# Patient Record
Sex: FEMALE | Race: BLACK OR AFRICAN AMERICAN | NOT HISPANIC OR LATINO | URBAN - METROPOLITAN AREA
[De-identification: names, ages, dates, MRNs, and addresses within clinical notes are randomized per-mention and may not be internally consistent; named-entity substitution may affect disease eponyms.]

---

## 2017-09-22 ENCOUNTER — EMERGENCY (EMERGENCY)
Facility: HOSPITAL | Age: 27
LOS: 1 days | Discharge: HOME | End: 2017-09-22
Attending: EMERGENCY MEDICINE | Admitting: EMERGENCY MEDICINE
Payer: COMMERCIAL

## 2017-09-22 VITALS
HEART RATE: 96 BPM | OXYGEN SATURATION: 100 % | RESPIRATION RATE: 16 BRPM | SYSTOLIC BLOOD PRESSURE: 156 MMHG | DIASTOLIC BLOOD PRESSURE: 82 MMHG

## 2017-09-22 VITALS
DIASTOLIC BLOOD PRESSURE: 76 MMHG | RESPIRATION RATE: 17 BRPM | TEMPERATURE: 99 F | HEART RATE: 92 BPM | SYSTOLIC BLOOD PRESSURE: 128 MMHG | OXYGEN SATURATION: 99 %

## 2017-09-22 LAB
ALBUMIN SERPL ELPH-MCNC: 4.2 G/DL — SIGNIFICANT CHANGE UP (ref 3.3–5)
ALP SERPL-CCNC: 77 U/L — SIGNIFICANT CHANGE UP (ref 40–120)
ALT FLD-CCNC: 15 U/L RC — SIGNIFICANT CHANGE UP (ref 10–45)
ANION GAP SERPL CALC-SCNC: 13 MMOL/L — SIGNIFICANT CHANGE UP (ref 5–17)
APTT BLD: 26.6 SEC — LOW (ref 27.5–37.4)
AST SERPL-CCNC: 20 U/L — SIGNIFICANT CHANGE UP (ref 10–40)
BASE EXCESS BLDV CALC-SCNC: 0.7 MMOL/L — SIGNIFICANT CHANGE UP (ref -2–2)
BASOPHILS # BLD AUTO: 0 K/UL — SIGNIFICANT CHANGE UP (ref 0–0.2)
BILIRUB SERPL-MCNC: 0.5 MG/DL — SIGNIFICANT CHANGE UP (ref 0.2–1.2)
BUN SERPL-MCNC: 9 MG/DL — SIGNIFICANT CHANGE UP (ref 7–23)
CA-I SERPL-SCNC: 1.2 MMOL/L — SIGNIFICANT CHANGE UP (ref 1.12–1.3)
CALCIUM SERPL-MCNC: 8.9 MG/DL — SIGNIFICANT CHANGE UP (ref 8.4–10.5)
CHLORIDE BLDV-SCNC: 106 MMOL/L — SIGNIFICANT CHANGE UP (ref 96–108)
CHLORIDE SERPL-SCNC: 103 MMOL/L — SIGNIFICANT CHANGE UP (ref 96–108)
CO2 BLDV-SCNC: 28 MMOL/L — SIGNIFICANT CHANGE UP (ref 22–30)
CO2 SERPL-SCNC: 25 MMOL/L — SIGNIFICANT CHANGE UP (ref 22–31)
CREAT SERPL-MCNC: 0.63 MG/DL — SIGNIFICANT CHANGE UP (ref 0.5–1.3)
EOSINOPHIL # BLD AUTO: 0.8 K/UL — HIGH (ref 0–0.5)
EOSINOPHIL NFR BLD AUTO: 3 % — SIGNIFICANT CHANGE UP (ref 0–6)
GAS PNL BLDV: 139 MMOL/L — SIGNIFICANT CHANGE UP (ref 136–145)
GAS PNL BLDV: SIGNIFICANT CHANGE UP
GAS PNL BLDV: SIGNIFICANT CHANGE UP
GLUCOSE BLDV-MCNC: 86 MG/DL — SIGNIFICANT CHANGE UP (ref 70–99)
GLUCOSE SERPL-MCNC: 87 MG/DL — SIGNIFICANT CHANGE UP (ref 70–99)
HCG SERPL-ACNC: <2 MIU/ML — SIGNIFICANT CHANGE UP
HCO3 BLDV-SCNC: 26 MMOL/L — SIGNIFICANT CHANGE UP (ref 21–29)
HCT VFR BLD CALC: 37.1 % — SIGNIFICANT CHANGE UP (ref 34.5–45)
HCT VFR BLDA CALC: 36 % — LOW (ref 39–50)
HGB BLD CALC-MCNC: 11.8 G/DL — SIGNIFICANT CHANGE UP (ref 11.5–15.5)
HGB BLD-MCNC: 11.9 G/DL — SIGNIFICANT CHANGE UP (ref 11.5–15.5)
HYPOCHROMIA BLD QL: SLIGHT — SIGNIFICANT CHANGE UP
INR BLD: 1.13 RATIO — SIGNIFICANT CHANGE UP (ref 0.88–1.16)
LACTATE BLDV-MCNC: 1.6 MMOL/L — SIGNIFICANT CHANGE UP (ref 0.7–2)
LYMPHOCYTES # BLD AUTO: 10 % — LOW (ref 13–44)
LYMPHOCYTES # BLD AUTO: 2.1 K/UL — SIGNIFICANT CHANGE UP (ref 1–3.3)
MACROCYTES BLD QL: SLIGHT — SIGNIFICANT CHANGE UP
MCHC RBC-ENTMCNC: 23.2 PG — LOW (ref 27–34)
MCHC RBC-ENTMCNC: 32 GM/DL — SIGNIFICANT CHANGE UP (ref 32–36)
MCV RBC AUTO: 72.6 FL — LOW (ref 80–100)
MONOCYTES # BLD AUTO: 0.6 K/UL — SIGNIFICANT CHANGE UP (ref 0–0.9)
MONOCYTES NFR BLD AUTO: 3 % — SIGNIFICANT CHANGE UP (ref 2–14)
NEUTROPHILS # BLD AUTO: 7.6 K/UL — HIGH (ref 1.8–7.4)
NEUTROPHILS NFR BLD AUTO: 84 % — HIGH (ref 43–77)
PCO2 BLDV: 49 MMHG — SIGNIFICANT CHANGE UP (ref 35–50)
PH BLDV: 7.35 — SIGNIFICANT CHANGE UP (ref 7.35–7.45)
PLAT MORPH BLD: NORMAL — SIGNIFICANT CHANGE UP
PLATELET # BLD AUTO: 231 K/UL — SIGNIFICANT CHANGE UP (ref 150–400)
PO2 BLDV: 33 MMHG — SIGNIFICANT CHANGE UP (ref 25–45)
POLYCHROMASIA BLD QL SMEAR: SLIGHT — SIGNIFICANT CHANGE UP
POTASSIUM BLDV-SCNC: 3.7 MMOL/L — SIGNIFICANT CHANGE UP (ref 3.5–5)
POTASSIUM SERPL-MCNC: 3.7 MMOL/L — SIGNIFICANT CHANGE UP (ref 3.5–5.3)
POTASSIUM SERPL-SCNC: 3.7 MMOL/L — SIGNIFICANT CHANGE UP (ref 3.5–5.3)
PROT SERPL-MCNC: 7.4 G/DL — SIGNIFICANT CHANGE UP (ref 6–8.3)
PROTHROM AB SERPL-ACNC: 12.4 SEC — SIGNIFICANT CHANGE UP (ref 9.8–12.7)
RBC # BLD: 5.11 M/UL — SIGNIFICANT CHANGE UP (ref 3.8–5.2)
RBC # FLD: 13 % — SIGNIFICANT CHANGE UP (ref 10.3–14.5)
RBC BLD AUTO: ABNORMAL
SAO2 % BLDV: 55 % — LOW (ref 67–88)
SODIUM SERPL-SCNC: 141 MMOL/L — SIGNIFICANT CHANGE UP (ref 135–145)
WBC # BLD: 11.1 K/UL — HIGH (ref 3.8–10.5)
WBC # FLD AUTO: 11.1 K/UL — HIGH (ref 3.8–10.5)

## 2017-09-22 PROCEDURE — 73030 X-RAY EXAM OF SHOULDER: CPT | Mod: 26,LT

## 2017-09-22 PROCEDURE — 82435 ASSAY OF BLOOD CHLORIDE: CPT

## 2017-09-22 PROCEDURE — 72125 CT NECK SPINE W/O DYE: CPT

## 2017-09-22 PROCEDURE — 73030 X-RAY EXAM OF SHOULDER: CPT

## 2017-09-22 PROCEDURE — 90715 TDAP VACCINE 7 YRS/> IM: CPT

## 2017-09-22 PROCEDURE — 73110 X-RAY EXAM OF WRIST: CPT | Mod: 26,LT

## 2017-09-22 PROCEDURE — 72125 CT NECK SPINE W/O DYE: CPT | Mod: 26

## 2017-09-22 PROCEDURE — 99284 EMERGENCY DEPT VISIT MOD MDM: CPT | Mod: 25

## 2017-09-22 PROCEDURE — 99285 EMERGENCY DEPT VISIT HI MDM: CPT

## 2017-09-22 PROCEDURE — 71260 CT THORAX DX C+: CPT

## 2017-09-22 PROCEDURE — 70450 CT HEAD/BRAIN W/O DYE: CPT | Mod: 26

## 2017-09-22 PROCEDURE — 84295 ASSAY OF SERUM SODIUM: CPT

## 2017-09-22 PROCEDURE — 85027 COMPLETE CBC AUTOMATED: CPT

## 2017-09-22 PROCEDURE — 82947 ASSAY GLUCOSE BLOOD QUANT: CPT

## 2017-09-22 PROCEDURE — 85610 PROTHROMBIN TIME: CPT

## 2017-09-22 PROCEDURE — 80053 COMPREHEN METABOLIC PANEL: CPT

## 2017-09-22 PROCEDURE — 73060 X-RAY EXAM OF HUMERUS: CPT | Mod: 26,LT

## 2017-09-22 PROCEDURE — 74177 CT ABD & PELVIS W/CONTRAST: CPT | Mod: 26

## 2017-09-22 PROCEDURE — 85014 HEMATOCRIT: CPT

## 2017-09-22 PROCEDURE — 96374 THER/PROPH/DIAG INJ IV PUSH: CPT | Mod: XU

## 2017-09-22 PROCEDURE — 73110 X-RAY EXAM OF WRIST: CPT

## 2017-09-22 PROCEDURE — 83605 ASSAY OF LACTIC ACID: CPT

## 2017-09-22 PROCEDURE — 70450 CT HEAD/BRAIN W/O DYE: CPT

## 2017-09-22 PROCEDURE — 82803 BLOOD GASES ANY COMBINATION: CPT

## 2017-09-22 PROCEDURE — 84702 CHORIONIC GONADOTROPIN TEST: CPT

## 2017-09-22 PROCEDURE — 90471 IMMUNIZATION ADMIN: CPT

## 2017-09-22 PROCEDURE — 82330 ASSAY OF CALCIUM: CPT

## 2017-09-22 PROCEDURE — 85730 THROMBOPLASTIN TIME PARTIAL: CPT

## 2017-09-22 PROCEDURE — 84132 ASSAY OF SERUM POTASSIUM: CPT

## 2017-09-22 PROCEDURE — 73060 X-RAY EXAM OF HUMERUS: CPT

## 2017-09-22 PROCEDURE — 71260 CT THORAX DX C+: CPT | Mod: 26

## 2017-09-22 PROCEDURE — 74177 CT ABD & PELVIS W/CONTRAST: CPT

## 2017-09-22 RX ORDER — ACETAMINOPHEN 500 MG
1000 TABLET ORAL ONCE
Qty: 0 | Refills: 0 | Status: COMPLETED | OUTPATIENT
Start: 2017-09-22 | End: 2017-09-22

## 2017-09-22 RX ORDER — TETANUS TOXOID, REDUCED DIPHTHERIA TOXOID AND ACELLULAR PERTUSSIS VACCINE, ADSORBED 5; 2.5; 8; 8; 2.5 [IU]/.5ML; [IU]/.5ML; UG/.5ML; UG/.5ML; UG/.5ML
0.5 SUSPENSION INTRAMUSCULAR ONCE
Qty: 0 | Refills: 0 | Status: COMPLETED | OUTPATIENT
Start: 2017-09-22 | End: 2017-09-22

## 2017-09-22 RX ADMIN — TETANUS TOXOID, REDUCED DIPHTHERIA TOXOID AND ACELLULAR PERTUSSIS VACCINE, ADSORBED 0.5 MILLILITER(S): 5; 2.5; 8; 8; 2.5 SUSPENSION INTRAMUSCULAR at 15:58

## 2017-09-22 RX ADMIN — Medication 400 MILLIGRAM(S): at 15:58
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
## 2017-09-22 NOTE — ED PROVIDER NOTE - PHYSICAL EXAMINATION
Burnham:  General: No distress.  Mentation at baseline.   HEENT: WNL  Chest/Lungs: CTAB, No wheeze, No retractions, No increased work of breathing, Normal rate  Heart: S1S2 RRR, No M/R/G, Pules equal Bilaterally in upper and lower extremities distally  Abd: soft, NT/ND, No guarding, No rebound.  No hernias, no palpable masses.  Extrem: deformity to left humeral area, no significant edema noted, No ulcers.  Cap refil < 2sec.  Skin: No rash noted, warm dry.  Neuro:  Grossly normal.  No difficulty ambulating. No focal deficits.  Psychiatric: No evidence of delusions. No SI/HI.
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
## 2017-09-22 NOTE — ED PROVIDER NOTE - ATTENDING CONTRIBUTION TO CARE
Chacha:  I have independently evaluated the patient and have documented in the appropriate sections above.  I agree with the exam and plan as noted above.

## 2017-09-22 NOTE — ED PROVIDER NOTE - PROGRESS NOTE DETAILS
Reed: no fracture in arm all radiology studies negative.  Cleaned wounds and bacitracin applied. Patient ok for discharge, follow up discussed.

## 2017-09-22 NOTE — ED ADULT NURSE NOTE - CAS TRG GEN SKIN COLOR
Received a Rejection Message from  [facility] [place]    Re: metFORMIN (GLUCOPHAGE-XR) 500 MG 24 hr tablet, Sig: Take 2 tablets (1,000 mg) by mouth daily (with dinner), disp #60 x 1, Approved 03/08/2017    Message: [facility] must fill 90-Day supply, if not on hold obtain 90 day Rx from MD   Normal for race

## 2018-04-01 ENCOUNTER — EMERGENCY (EMERGENCY)
Facility: HOSPITAL | Age: 28
LOS: 1 days | Discharge: HOME | End: 2018-04-01
Attending: EMERGENCY MEDICINE | Admitting: EMERGENCY MEDICINE
Payer: COMMERCIAL

## 2018-04-01 VITALS
HEART RATE: 106 BPM | SYSTOLIC BLOOD PRESSURE: 134 MMHG | TEMPERATURE: 98 F | DIASTOLIC BLOOD PRESSURE: 74 MMHG | OXYGEN SATURATION: 100 % | RESPIRATION RATE: 18 BRPM

## 2018-04-01 PROCEDURE — 99285 EMERGENCY DEPT VISIT HI MDM: CPT | Mod: 25

## 2018-04-01 NOTE — ED ADULT TRIAGE NOTE - CHIEF COMPLAINT QUOTE
Pt c/o palpitations this afternoon, this evening c/o chest discomfort. Denies SOB. Denies n/v. Denies PMH.

## 2018-04-02 LAB
ALBUMIN SERPL ELPH-MCNC: 4.6 G/DL — SIGNIFICANT CHANGE UP (ref 3.3–5)
ALP SERPL-CCNC: 85 U/L — SIGNIFICANT CHANGE UP (ref 40–120)
ALT FLD-CCNC: 13 U/L — SIGNIFICANT CHANGE UP (ref 4–33)
ANISOCYTOSIS BLD QL: SIGNIFICANT CHANGE UP
AST SERPL-CCNC: 19 U/L — SIGNIFICANT CHANGE UP (ref 4–32)
BASOPHILS # BLD AUTO: 0.05 K/UL — SIGNIFICANT CHANGE UP (ref 0–0.2)
BASOPHILS NFR BLD AUTO: 0.4 % — SIGNIFICANT CHANGE UP (ref 0–2)
BASOPHILS NFR SPEC: 0.9 % — SIGNIFICANT CHANGE UP (ref 0–2)
BILIRUB SERPL-MCNC: 0.5 MG/DL — SIGNIFICANT CHANGE UP (ref 0.2–1.2)
BUN SERPL-MCNC: 10 MG/DL — SIGNIFICANT CHANGE UP (ref 7–23)
CALCIUM SERPL-MCNC: 9.1 MG/DL — SIGNIFICANT CHANGE UP (ref 8.4–10.5)
CHLORIDE SERPL-SCNC: 101 MMOL/L — SIGNIFICANT CHANGE UP (ref 98–107)
CK MB BLD-MCNC: 1.7 NG/ML — SIGNIFICANT CHANGE UP (ref 1–4.7)
CK SERPL-CCNC: 165 U/L — SIGNIFICANT CHANGE UP (ref 25–170)
CO2 SERPL-SCNC: 28 MMOL/L — SIGNIFICANT CHANGE UP (ref 22–31)
CREAT SERPL-MCNC: 0.7 MG/DL — SIGNIFICANT CHANGE UP (ref 0.5–1.3)
D DIMER BLD IA.RAPID-MCNC: 278 NG/ML — SIGNIFICANT CHANGE UP
EOSINOPHIL # BLD AUTO: 1 K/UL — HIGH (ref 0–0.5)
EOSINOPHIL NFR BLD AUTO: 8.7 % — HIGH (ref 0–6)
EOSINOPHIL NFR FLD: 7 % — HIGH (ref 0–6)
GIANT PLATELETS BLD QL SMEAR: PRESENT — SIGNIFICANT CHANGE UP
GLUCOSE SERPL-MCNC: 88 MG/DL — SIGNIFICANT CHANGE UP (ref 70–99)
HCT VFR BLD CALC: 37.7 % — SIGNIFICANT CHANGE UP (ref 34.5–45)
HGB BLD-MCNC: 11.7 G/DL — SIGNIFICANT CHANGE UP (ref 11.5–15.5)
IMM GRANULOCYTES # BLD AUTO: 0.04 # — SIGNIFICANT CHANGE UP
IMM GRANULOCYTES NFR BLD AUTO: 0.3 % — SIGNIFICANT CHANGE UP (ref 0–1.5)
LYMPHOCYTES # BLD AUTO: 18.8 % — SIGNIFICANT CHANGE UP (ref 13–44)
LYMPHOCYTES # BLD AUTO: 2.15 K/UL — SIGNIFICANT CHANGE UP (ref 1–3.3)
LYMPHOCYTES NFR SPEC AUTO: 8.8 % — LOW (ref 13–44)
MCHC RBC-ENTMCNC: 22.4 PG — LOW (ref 27–34)
MCHC RBC-ENTMCNC: 31 % — LOW (ref 32–36)
MCV RBC AUTO: 72.2 FL — LOW (ref 80–100)
MICROCYTES BLD QL: SIGNIFICANT CHANGE UP
MONOCYTES # BLD AUTO: 1.02 K/UL — HIGH (ref 0–0.9)
MONOCYTES NFR BLD AUTO: 8.9 % — SIGNIFICANT CHANGE UP (ref 2–14)
MONOCYTES NFR BLD: 3.5 % — SIGNIFICANT CHANGE UP (ref 2–9)
NEUTROPHIL AB SER-ACNC: 77.2 % — HIGH (ref 43–77)
NEUTROPHILS # BLD AUTO: 7.18 K/UL — SIGNIFICANT CHANGE UP (ref 1.8–7.4)
NEUTROPHILS NFR BLD AUTO: 62.9 % — SIGNIFICANT CHANGE UP (ref 43–77)
NRBC # FLD: 0 — SIGNIFICANT CHANGE UP
PLATELET # BLD AUTO: 243 K/UL — SIGNIFICANT CHANGE UP (ref 150–400)
PLATELET COUNT - ESTIMATE: NORMAL — SIGNIFICANT CHANGE UP
PMV BLD: 11.1 FL — SIGNIFICANT CHANGE UP (ref 7–13)
POIKILOCYTOSIS BLD QL AUTO: SLIGHT — SIGNIFICANT CHANGE UP
POLYCHROMASIA BLD QL SMEAR: SIGNIFICANT CHANGE UP
POTASSIUM SERPL-MCNC: 4 MMOL/L — SIGNIFICANT CHANGE UP (ref 3.5–5.3)
POTASSIUM SERPL-SCNC: 4 MMOL/L — SIGNIFICANT CHANGE UP (ref 3.5–5.3)
PROT SERPL-MCNC: 7.4 G/DL — SIGNIFICANT CHANGE UP (ref 6–8.3)
RBC # BLD: 5.22 M/UL — HIGH (ref 3.8–5.2)
RBC # FLD: 14.1 % — SIGNIFICANT CHANGE UP (ref 10.3–14.5)
SODIUM SERPL-SCNC: 138 MMOL/L — SIGNIFICANT CHANGE UP (ref 135–145)
TROPONIN T SERPL-MCNC: < 0.06 NG/ML — SIGNIFICANT CHANGE UP (ref 0–0.06)
TSH SERPL-MCNC: 2.11 UIU/ML — SIGNIFICANT CHANGE UP (ref 0.27–4.2)
VARIANT LYMPHS # BLD: 2.6 % — SIGNIFICANT CHANGE UP
WBC # BLD: 11.44 K/UL — HIGH (ref 3.8–10.5)
WBC # FLD AUTO: 11.44 K/UL — HIGH (ref 3.8–10.5)

## 2018-04-02 PROCEDURE — 71046 X-RAY EXAM CHEST 2 VIEWS: CPT | Mod: 26

## 2018-04-02 PROCEDURE — 71275 CT ANGIOGRAPHY CHEST: CPT | Mod: 26

## 2018-04-02 RX ORDER — SODIUM CHLORIDE 9 MG/ML
1000 INJECTION INTRAMUSCULAR; INTRAVENOUS; SUBCUTANEOUS ONCE
Qty: 0 | Refills: 0 | Status: COMPLETED | OUTPATIENT
Start: 2018-04-02 | End: 2018-04-02

## 2018-04-02 NOTE — ED PROVIDER NOTE - MEDICAL DECISION MAKING DETAILS
27 y.o female no pmhx here with intermittent palpitations since 1:30 this afternoon with L sided cp that felt like her gas pains, no current. EKG sinus tach. Pt refusing IV. Will draw basic labs, dimer, ce , tsh, xray chest, reassess. 27 y.o female no pmhx here with intermittent palpitations since 1:30 this afternoon with L sided cp that felt like her gas pains, no current symptoms. EKG sinus tachycardia. Pt refusing IV. Will draw basic labs, dimer, ce , tsh, xray chest, reassess. discussed if Ddimer positive will need IV, agrees. 27 y.o female no pmhx here with intermittent palpitations/heart racing since 1:30 this afternoon with L sided cp that felt like her gas pains, no current cp. EKG sinus tachycardia. Pt refusing IV at this time. Will draw basic labs, dimer, ce , tsh, xray chest, reassess. discussed if Ddimer positive will need IV, patient agrees. 27 y.o female no pmhx here with intermittent palpitations/heart racing since 1:30 this afternoon with associated L sided chest discomfort. no current cp. EKG sinus tachycardia. Will draw basic labs, dimer, ce , tsh, xray chest, reassess. refusing iv placement at this time, discussed if Ddimer positive will need IV, patient agrees.

## 2018-04-02 NOTE — ED PROVIDER NOTE - OBJECTIVE STATEMENT
27 y.o female no pmhx here with intermittent palpitations since 1:30 this afternoon after taking Claritin D for her allergies. States she has been feeling like her heart was racing throughout the day. Also states had some chest discomfort over her L breast that feels like gas that she gets. Denies fevers, chills, SOB, cough, n/v/d, abdominal pain, numbness, tingling, weakness, urinary symptoms. 27 y.o female no pmhx here with intermittent palpitations since 1:30 this afternoon after taking Claritin D for her allergies. States she has been feeling like her heart was racing throughout the day.  Was sitting at home watching tv tonight and said her HR was in the 120s and her  told her to come to the ED. Also states had some chest discomfort that started with the palpitations over her L chest intermittently today that feels like gas that she gets. Denies fevers, chills, SOB, cough, n/v/d, abdominal pain, numbness, tingling, weakness, urinary symptoms. No recent travel ,no LE swelling, not on OCPs. 27 y.o female no pmhx here with intermittent palpitations since 1:30 this afternoon after taking Claritin D for her allergies, has taken this medication many times for allergies and never had an issue. States she has been feeling like her heart was racing throughout the day. Was sitting at home watching tv tonight and said her HR was in the 120s and her  told her to come to the ED. Also states had some chest discomfort that started with the palpitations over her L chest intermittently today that feels like maybe gas that she gets. Denies fevers, chills, SOB, cough, n/v/d, abdominal pain, numbness, tingling, weakness, urinary symptoms. No recent travel ,no LE swelling, not on OCPs.

## 2018-04-02 NOTE — ED PROVIDER NOTE - PROGRESS NOTE DETAILS
serg: pt with positive Ddimer- will place line and get CTA, pt agrees with plan. signed out to ANIA Thompson to f/u CTA results and lab results. PA Thompson- CTA neg for PE. Pt stable for dc

## 2018-04-02 NOTE — ED PROVIDER NOTE - ATTENDING CONTRIBUTION TO CARE
MD Vargas:  patient seen and evaluated with the PA.  I was present for key portions of the History and Physical, and I agree with the Impression and Plan.    MD Vargas:  28 yo F, c/o palpitations x 10 hrs with associated CP.  Intensity:  5/10.  Location:  substernal.  Quality:  sharp pain, heart racing sensation.  Better/worse:  none.  VS - tachy, normotensive.  Physical Exam: adult F, NAD, NCAT, PERRL, EOMI, neck supple, CTA B, +tachy, no calf pain, no abd pain.  Impression:  palpitations with abnormal ECG.  Plan:  labs, ddimer, reassess.

## 2019-09-16 NOTE — ED ADULT NURSE NOTE - PT NEEDS ASSIST
Mica Olguin D/C'd.  Discharge instructions including s/s to return to ED, follow up appointments, hydration importance and fracture provided to pt/family.    Parents verbalized understanding with no further questions and concerns.    Copy of discharge provided to pt/family.  Signed copy in chart.    Pt walked out of department with mother; pt in NAD, awake, alert, interactive and age appropriate.          yes

## 2020-05-26 ENCOUNTER — EMERGENCY (EMERGENCY)
Facility: HOSPITAL | Age: 30
LOS: 1 days | Discharge: HOME | End: 2020-05-26
Attending: STUDENT IN AN ORGANIZED HEALTH CARE EDUCATION/TRAINING PROGRAM
Payer: COMMERCIAL

## 2020-05-26 VITALS
SYSTOLIC BLOOD PRESSURE: 134 MMHG | DIASTOLIC BLOOD PRESSURE: 74 MMHG | OXYGEN SATURATION: 97 % | TEMPERATURE: 99 F | RESPIRATION RATE: 16 BRPM | HEART RATE: 103 BPM

## 2020-05-26 PROCEDURE — 99282 EMERGENCY DEPT VISIT SF MDM: CPT

## 2020-05-26 NOTE — ED ADULT TRIAGE NOTE - CHIEF COMPLAINT QUOTE
Pt. c/o sore throat since this AM. States she also had hand swelling since yesterday which has subsided. Went to her PCP today and was given a steroid shot. Denies cough, n/v/d or fevers. No known covid exposure.

## 2020-05-26 NOTE — ED PROVIDER NOTE - NSFOLLOWUPINSTRUCTIONS_ED_ALL_ED_FT
Pharyngitis    Pharyngitis is inflammation of your pharynx, which is typically caused by a viral or bacterial infection. Pharyngitis can be contagious and may spread from person to person through intimate contact, coughing, sneezing, or sharing personal items and utensils. Symptoms of pharyngitis may include sore throat, fever, headache, or swollen lymph nodes. If you are prescribed antibiotics, make sure you finish them even if you start to feel better. Gargle with salt water as often as every 1-2 hours to soothe your throat. Throat lozenges (if you are not at risk for choking) or sprays may be used to soothe your throat.    SEEK IMMEDIATE MEDICAL CARE IF YOU HAVE ANY OF THE FOLLOWING SYMPTOMS: neck stiffness, drooling, hoarseness or change in voice, inability to swallow liquids, vomiting, or trouble breathing.     -Please follow up with your Primary Care Doctor    -Please follow up with your gastroenterologist

## 2020-05-26 NOTE — ED PROVIDER NOTE - ATTENDING CONTRIBUTION TO CARE
Well appearing Young female with one day sore throat hemodynamically stable with midline uvula and no tonsilar swelling or exudates. Sore throat likely 2/2 to patients worsening GERD. Reassurance, diet recs with discharge home and return precautions.    MADELINE Beth: I have personally performed a face to face bedside history and physical examination of this patient. I have discussed the history, examination, review of systems, assessment and plan of management with the resident. I have reviewed the electronic medical record and amended it to reflect my history, review of systems, physical exam, assessment and plan.

## 2020-05-26 NOTE — ED PROVIDER NOTE - PROGRESS NOTE DETAILS
Jeff, PGY1: Explained to patient decadron injection will improve throat pain. VSS. Time was taken to answer all of patients questions and concerns. Return precaution instructions were given and patient understands and feels comfortable with disposition. Pt advised about diet changes. Pt has appointment for endoscopy on Saturday. Was offered COVID testing but patient declined.

## 2020-05-26 NOTE — ED PROVIDER NOTE - PATIENT PORTAL LINK FT
You can access the FollowMyHealth Patient Portal offered by [facility] by registering at the following website: [email or web address]. By joining [employer]’s FollowMyHealth portal, you will also be able to view your health information using other applications (apps) compatible with our system.

## 2020-05-26 NOTE — ED PROVIDER NOTE - CLINICAL SUMMARY MEDICAL DECISION MAKING FREE TEXT BOX
Pt is a young female with one day sore throat s/p glucocorticoid injection. Her physical exam came back normal and patient hemodynamically stable. She is well appearing. Plan for discharge home with reassurance. Well appearing Young female with one day sore throat hemodynamically stable with midline uvula and no tonsilar swelling or exudates. Sore throat likely 2/2 to patients worsening GERD. Reassurance, diet recs with discharge home and return precautions.

## 2020-05-26 NOTE — ED PROVIDER NOTE - OBJECTIVE STATEMENT
Pt is a 34 y/o female with no PMHx presents to the ER today with a sore throat. She adds that she has swelling in her joints which includes her knees and elbows. The patient states that she has had muscle aches for the past 6 weeks. She recently visited her allergist, who gave her steroid injections, this did not help her symptoms. She came to the ER for further evaluation. Patient denies fever, chills, voice changes ,or drooling. Pt is a 31 y/o F with no PMHx presents to the ER today with a sore throat. She adds that she has swelling in her joints which includes her knees and elbows. The patient states that she has had muscle aches for the past 6 weeks. She visited her allergist today, who gave her decadron injection which did not help her symptoms. She came to the ER for further evaluation. Pt also c/o acid reflux symptoms been taking omeprazole with little relief, started taking famotidine today. Patient denies fever, chills, voice changes ,or drooling, nausea vomiting, chest pain, cough, sob, abdominal pain.

## 2020-06-13 ENCOUNTER — EMERGENCY (EMERGENCY)
Facility: HOSPITAL | Age: 30
LOS: 1 days | Discharge: HOME | End: 2020-06-13
Attending: EMERGENCY MEDICINE | Admitting: EMERGENCY MEDICINE
Payer: COMMERCIAL

## 2020-06-13 VITALS
HEART RATE: 94 BPM | RESPIRATION RATE: 17 BRPM | OXYGEN SATURATION: 96 % | TEMPERATURE: 98 F | SYSTOLIC BLOOD PRESSURE: 117 MMHG | DIASTOLIC BLOOD PRESSURE: 62 MMHG

## 2020-06-13 VITALS
RESPIRATION RATE: 20 BRPM | OXYGEN SATURATION: 98 % | TEMPERATURE: 99 F | SYSTOLIC BLOOD PRESSURE: 132 MMHG | DIASTOLIC BLOOD PRESSURE: 78 MMHG | HEART RATE: 117 BPM

## 2020-06-13 PROCEDURE — 70360 X-RAY EXAM OF NECK: CPT | Mod: 26

## 2020-06-13 PROCEDURE — 99284 EMERGENCY DEPT VISIT MOD MDM: CPT

## 2020-06-13 RX ORDER — DEXAMETHASONE 0.5 MG/5ML
10 ELIXIR ORAL ONCE
Refills: 0 | Status: COMPLETED | OUTPATIENT
Start: 2020-06-13 | End: 2020-06-13

## 2020-06-13 RX ADMIN — Medication 102 MILLIGRAM(S): at 02:16

## 2020-06-13 NOTE — ED ADULT NURSE NOTE - CHIEF COMPLAINT QUOTE
"Yesturday I was in [facility] because my throat was swollen, they diagnosed me with an allergic reaction and sent me home with prednisone. after they gave me prednisone yesterday I felt very shaky, I thought I was cold at first but today, I still feeling very shaky only after I take the prednisone which was at 1050PM, my mouth feels very dry, and I still have trouble swallowing." patient speaking in full sentences. no audio stridor noted.

## 2020-06-13 NOTE — ED PROVIDER NOTE - PHYSICAL EXAMINATION
General: well appearing, interactive, well nourished, no apparent distress, ncat  HEENT: EOMI, PERRLA, normal mucosa, normal oropharynx, no lesions on the lips or on oral mucosa, normal external ear  Neck: supple, no lymphadenopathy, full range of motion, no nuchal rigidity  CV: RRR, normal S1 and S2 with no murmur, capillary refill less than two seconds  Resp: lungs CTA b/l, good aeration bilaterally, symmetric chest wall   Abd: non-distended, soft, non-tender  : no CVA tenderness  MSK: full range of motion, no cyanosis, no edema, no clubbing, no immobility  Neuro: CN II-XII grossly intact, muscle strength 5/5 in all extremities, normal gait  Skin: no rashes, skin intact

## 2020-06-13 NOTE — ED PROVIDER NOTE - ATTENDING CONTRIBUTION TO CARE
MD Agee:  I performed a face to face bedside interview with patient regarding history of present illness, review of symptoms and past medical history. I completed an independent physical exam(documented below).  I have discussed patient's plan of care with resident.   I agree with note as stated above, having amended the EMR as needed to reflect my findings. I have discussed the assessment and plan of care.  This includes during the time I functioned as the attending physician for this patient.  PE:  Gen: Alert, NAD  Head: NC, AT,  EOMI, normal lids/conjunctiva  ENT:  normal hearing, patent oropharynx without erythema/exudate  Neck: +supple, no tenderness/meningismus/JVD, +Trachea midline  Chest: no chest wall tenderness, equal chest rise  Pulm: Bilateral BS, normal resp effort, no wheeze/stridor/retractions  CV: RRR, no M/R/G, +dist pulses  Abd: +BS, soft, NT/ND  Rectal: deferred  Mskel: no edema/erythema/cyanosis  Skin: no rash  Neuro: AAOx3  MDM:   31yo F, w/ working diagnosis of angioedema (via outpt w/u with allergist), was seen at [facility] for recurrent episode of this at which time she was prescribed prednisone, presents today c/o jitteriness after taking prednisone for first time ever (2 doses so far and jitteriness after each). Pt likely having adverse effect to prednisone, will give decadron instead as pt has tolerated this medication in the past. Of note, no oropharyngeal edema on exam, and pt is tolerating secretions, po food/drink and having no difficulty breathing at this time but c/o "tightness" in neck, will get xray to evaluate trachea.

## 2020-06-13 NOTE — ED PROVIDER NOTE - NSFOLLOWUPINSTRUCTIONS_ED_ALL_ED_FT
1) Please follow up with your Primary Care Provider in 24-48 hours  2) Seek immediate medical care for any new or returning symptoms including but not limited severe pain, difficulty breathing and/or eating, high fevers  3) Take Tylenol 650 mg every 4-6 hours as needed for pain. Do not take more than 2 grams within a 24 hour period

## 2020-06-13 NOTE — ED ADULT NURSE NOTE - OBJECTIVE STATEMENT
Allergic reaction yesterday, seen in [facility] prescribed prednisone and d/c'd to home, reports took prescribed prednisone w/ acute onset tremors, anxiety and dysphagia, reports throat feeling tight and lump in throat. Patient stable, reports seen in this ED last month for s/a c/o, anti-allergy cocktail administered w/ effect, seen allergist outpatient dx: angioedema, no food allergies, (+)environmental allergies. Denies cutaneous involvement, no hives, pruritus or rash, denies recent infection, no SOB.   Uvula midline, non-edematous, no tonsillar or tongue swelling, airway patent and intact, self-maintained, no thyromegaly. Allergic reaction yesterday, seen in [facility] prescribed prednisone and d/c'd to home, reports took prescribed prednisone w/ acute onset tremors, anxiety and dysphagia, reports throat feeling tight and lump in throat. Patient stable, reports seen in this ED last month for s/a c/o, anti-allergy cocktail administered w/ effect, seen allergist outpatient dx: angioedema, no food allergies, (+)environmental allergies, subsequently prescribed zyall and . Denies cutaneous involvement, no hives, pruritus or rash, denies recent infection, no SOB.   Uvula midline, non-edematous, no tonsillar or tongue swelling, airway patent and intact, self-maintained, no thyromegaly.

## 2020-06-13 NOTE — ED PROVIDER NOTE - PROGRESS NOTE DETAILS
Patient reassessed, NAD, non-toxic appearing. results dw pt, questions answered. reports mild improvement. dc w/ strict return precautions.  - Bam Santana D.O. PGY2

## 2020-06-13 NOTE — ED ADULT TRIAGE NOTE - CHIEF COMPLAINT QUOTE
"Yesturday I was in [facility] because my throat was swollen, they diagnosed me with an allergic reaction and sent me home with prednisone. after they gave me prednisone yesterday I felt very shaky, I thought I was cold at first but today, I still feeling very shaky only after I take the prednisone which was at 1050PM, my mouth feels very dry, and I still have trouble swallowing." patient speaking in full sentences. no audio stridor noted.

## 2020-06-13 NOTE — ED PROVIDER NOTE - OBJECTIVE STATEMENT
31 yo f pmh angioedema, pw throat discomfort. pt reports went to OSH yesterday for throat discomfort (swelling, hoarsesness), was given prednisone and dc. pt reports took prednisone yesterday and today and developed jitteriness each time after episode. pt was concerned w/ sx and desired eval. pt reports throat discomfort less than yesterday however, still present. pt was eval by allergist recently w/ negative patch test for food allegies and told she has enviromental allergies. pt was told to take allegra and xolair. denies f/c, cp, sob, cough, n/v. tolerating po.

## 2020-06-13 NOTE — ED PROVIDER NOTE - NS ED ROS FT
GENERAL: No fever or chills, //             EYES: no change in vision, //             HEENT: throat discomfort //             CARDIAC: no chest pain, //              PULMONARY: no cough or SOB, //             GI: no abdominal pain, no nausea or no vomiting, no diarrhea or constipation, //             : No changes in urination,  //            SKIN: no rashes,  //            NEURO: no headache,  //             MSK: No joint pain otherwise as HPI or negative. ~Bam Santana DO PGY2

## 2020-06-13 NOTE — ED PROVIDER NOTE - PATIENT PORTAL LINK FT
You can access the FollowMyHealth Patient Portal offered by [facility] by registering at the following website: [email or web address]. By joining [employer]’s FollowMyHealth portal, you will also be able to view your health information using other applications (apps) compatible with our system.

## 2020-06-13 NOTE — ED PROVIDER NOTE - CLINICAL SUMMARY MEDICAL DECISION MAKING FREE TEXT BOX
cass pgy2: 31 yo f pw throat discomfort in setting of new dx of angioedema and rxn to prednisone. pt well appearing, speaking full sentences, no s/s of airway compromise. will rec pt to stop prednisone, provide decadron and observe. pt will likely require outpatient to continue rubio. no s/s infectious etiology.

## 2020-07-08 ENCOUNTER — APPOINTMENT (OUTPATIENT)
Dept: NEUROLOGY | Facility: CLINIC | Age: 30
End: 2020-07-08
Payer: COMMERCIAL

## 2020-07-08 VITALS
DIASTOLIC BLOOD PRESSURE: 76 MMHG | HEIGHT: 65 IN | BODY MASS INDEX: 26.66 KG/M2 | HEART RATE: 82 BPM | WEIGHT: 160 LBS | SYSTOLIC BLOOD PRESSURE: 114 MMHG

## 2020-07-08 VITALS — TEMPERATURE: 98 F

## 2020-07-08 DIAGNOSIS — K21.9 GASTRO-ESOPHAGEAL REFLUX DISEASE W/OUT ESOPHAGITIS: ICD-10-CM

## 2020-07-08 DIAGNOSIS — Z78.9 OTHER SPECIFIED HEALTH STATUS: ICD-10-CM

## 2020-07-08 DIAGNOSIS — G56.03 CARPAL TUNNEL SYNDROM,BILATERAL UPPER LIMBS: ICD-10-CM

## 2020-07-08 DIAGNOSIS — J30.2 OTHER SEASONAL ALLERGIC RHINITIS: ICD-10-CM

## 2020-07-08 PROBLEM — Z00.00 ENCOUNTER FOR PREVENTIVE HEALTH EXAMINATION: Status: ACTIVE | Noted: 2020-07-08

## 2020-07-08 PROCEDURE — 99205 OFFICE O/P NEW HI 60 MIN: CPT

## 2020-07-08 RX ORDER — LEVOCETIRIZINE DIHYDROCHLORIDE 0.5 MG/ML
SOLUTION ORAL
Refills: 0 | Status: ACTIVE | COMMUNITY
Start: 2020-07-08

## 2020-07-08 RX ORDER — OMALIZUMAB 300 MG/2ML
INJECTION, SOLUTION SUBCUTANEOUS
Refills: 0 | Status: ACTIVE | COMMUNITY
Start: 2020-07-08
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
## 2020-07-08 NOTE — DISCUSSION/SUMMARY
[FreeTextEntry1] : 31 y/o female presents with intermittent numbness of hands, legs, and lip. Neurological exam is normal and symptoms do not localize to single CNS or PNS pathology. Given previous EMG results, patient's most persistent numbness in hands maybe s/t carpal or ulnar tunnel syndrome. \par \par -carpal tunnel: wrist brace at night\par -monitor symptoms for now; if symptoms are persistent or recurrent (in the case of lip numbness) then consider EMG or MRI\par -follow up in 6 months\par \par We discussed the above impression, plan and recommendations during the visit. Counseling represented more then 50% of the 60 minute visit time\par

## 2020-07-08 NOTE — PHYSICAL EXAM
[General Appearance - Well Nourished] : well nourished [General Appearance - Alert] : alert [General Appearance - In No Acute Distress] : in no acute distress [Affect] : the affect was normal [General Appearance - Well Developed] : well developed [General Appearance - Well-Appearing] : healthy appearing [Mood] : the mood was normal [Cranial Nerves Optic (II)] : visual acuity intact bilaterally,  visual fields full to confrontation, pupils equal round and reactive to light [Cranial Nerves Oculomotor (III)] : extraocular motion intact [Cranial Nerves Trigeminal (V)] : facial sensation intact symmetrically [Cranial Nerves Facial (VII)] : face symmetrical [Cranial Nerves Glossopharyngeal (IX)] : tongue and palate midline [Cranial Nerves Vestibulocochlear (VIII)] : hearing was intact bilaterally [Motor Strength] : muscle strength was normal in all four extremities [Cranial Nerves Hypoglossal (XII)] : there was no tongue deviation with protrusion [Cranial Nerves Accessory (XI - Cranial And Spinal)] : head turning and shoulder shrug symmetric [Motor Tone] : muscle tone was normal in all four extremities [Involuntary Movements] : no involuntary movements were seen [Sensation Tactile Decrease] : light touch was intact [Sensation Pain / Temperature Decrease] : pain and temperature was intact [Balance] : balance was intact [2+] : Ankle jerk left 2+ [Neck Appearance] : the appearance of the neck was normal [PERRL With Normal Accommodation] : pupils were equal in size, round, reactive to light, with normal accommodation [Extraocular Movements] : extraocular movements were intact [] : the neck was supple [Skin Color & Pigmentation] : normal skin color and pigmentation [No Spinal Tenderness] : no spinal tenderness [Abnormal Walk] : normal gait [Motor Strength Upper Extremities Bilaterally] : strength was normal in both upper extremities [Paresis Pronator Drift Right-Sided] : no pronator drift on the right [Paresis Pronator Drift Left-Sided] : no pronator drift on the left [Romberg's Sign] : Romberg's sign was negtive [Motor Strength Lower Extremities Bilaterally] : strength was normal in both lower extremities [Dysdiadochokinesia Bilaterally] : not present [Coordination - Dysmetria Impaired Heel-to-Shin Bilateral] : not present [Coordination - Dysmetria Impaired Finger-to-Nose Bilateral] : not present [Plantar Reflex Right Only] : normal on the right [Plantar Reflex Left Only] : normal on the left [___] : absent on the right [FreeTextEntry6] : Negative Tinel, negative Phalen [___] : absent on the left [FreeTextEntry1] : no cervical spinal tenderness

## 2020-09-14 NOTE — ED ADULT NURSE NOTE - TEMPLATE
Patient ID: Marie is a 69 year old female.    Chief Complaint   Patient presents with   • Office Visit   • Medicare Wellness Visit     Fasting.      HPI     Type 2 DM - Last A1C 7.8%. Taking metformin 1g BID, glimepiride 2mg daily. BGs 120-140 (highest), depends on diet. Denies blurry vision, increased thirst, increased urination or neuropathy. Has not seen ophtho in a long time. Would like a new ophtho.      Hypothyroidism - Taking levothyroxine 137mcg x 5 days per week    Mild intermittent asthma - Taking low dose Advair, Proair prn. Does not take Advair in warmer months (summer). Denies SOB or wheezing aside from when there is a chill in the air. Rarely uses Proair.      Brachioradial puritis - Occurs mostly at night. Sensation of ants crawling resolved, now just an itch. Ice pack helps. Using capsacin cream in AM after her shower. Overall improving. Denies fevers, night sweats or unintentional weight loss. No right shoulder pain.     Head tremor   - Chronic, failed meds, not interested in therapy    Walks dog 4x/day  Stretches  Bicycle    Colonoscopy 8/2020 (Dr. Goodson) - Diverticulosis, internal hemorrhoids. Repeat colonoscopy not recommended due to age.  Mammogram 8/2019 - Negative  Did not do DEXA scan last year  Paps had all been normal  UTD on both PNA shot  Tdap 2012  Never had flu shot and does not want one  Did not get shingles shot because she saw a man at [employer] screaming from the pain from the shot    Review of Systems   All other systems reviewed and are negative.    Current Outpatient Medications   Medication Sig Dispense Refill   • glimepiride (AMARYL) 2 MG tablet TAKE 1 TABLET BY MOUTH DAILY BEFORE BREAKFAST 90 tablet 1   • levothyroxine 137 MCG tablet TAKE 1 TABLET BY MOUTH 6 DAYS PER WEEK( NOTHING ONE DAY PER WEEK) 80 tablet 1   • metformin (GLUCOPHAGE) 1000 MG tablet Take 1 tablet by mouth 2 times daily (with meals). 180 tablet 1   • Glucose Blood (BLOOD GLUCOSE TEST STRIPS) Strip Check  blood sugar once daily 100 each 0   • fluticasone-salmeterol (ADVAIR DISKUS) 100-50 MCG/DOSE inhaler Inhale 1 puff into the lungs two times daily. 60 each 1   • albuterol (PROVENTIL HFA) 108 (90 Base) MCG/ACT inhaler Inhale 2 puffs into the lungs every 4 hours as needed for Shortness of Breath or Wheezing. 1 Inhaler 1     No current facility-administered medications for this visit.      Problem List Items Addressed This Visit     None        Past Medical History:   Diagnosis Date   • Diabetes mellitus (CMS/HCC)    • RAD (reactive airway disease)    • Thyroid condition      Past Surgical History:   Procedure Laterality Date   • Appendectomy     • Bladder suspension     • Tubal ligation       Family History   Problem Relation Age of Onset   • Heart disease Mother    • Heart disease Father      Social History     Socioeconomic History   • Marital status:      Spouse name: Not on file   • Number of children: Not on file   • Years of education: Not on file   • Highest education level: Not on file   Occupational History   • Not on file   Social Needs   • Financial resource strain: Not on file   • Food insecurity     Worry: Not on file     Inability: Not on file   • Transportation needs     Medical: Not on file     Non-medical: Not on file   Tobacco Use   • Smoking status: Never Smoker   • Smokeless tobacco: Never Used   Substance and Sexual Activity   • Alcohol use: Yes     Alcohol/week: 1.0 standard drinks     Types: 1 Glasses of wine per week     Frequency: Monthly or less     Drinks per session: 1 or 2     Binge frequency: Never   • Drug use: Never   • Sexual activity: Not on file   Lifestyle   • Physical activity     Days per week: Not on file     Minutes per session: Not on file   • Stress: Not on file   Relationships   • Social connections     Talks on phone: Not on file     Gets together: Not on file     Attends Islam service: Not on file     Active member of club or organization: Not on file      Attends meetings of clubs or organizations: Not on file     Relationship status: Not on file   • Intimate partner violence     Fear of current or ex partner: Not on file     Emotionally abused: Not on file     Physically abused: Not on file     Forced sexual activity: Not on file   Other Topics Concern   • Not on file   Social History Narrative   • Not on file       Patient's medications, allergies, past medical, surgical, social and family histories were reviewed and updated as appropriate.      Visit Vitals  BP (!) 140/70 (BP Location: RUE - Right upper extremity, Patient Position: Sitting, Cuff Size: Regular)   Pulse 80   Temp 97.8 °F (36.6 °C) (Temporal)   Resp 16   Ht 5' 1\" (1.549 m)   Wt 76.3 kg (168 lb 3.2 oz)   BMI 31.78 kg/m²       Physical Exam   Constitutional: She is oriented to person, place, and time. She appears well-developed and well-nourished. No distress.   HENT:   Head: Normocephalic and atraumatic.   Mouth/Throat: Oropharynx is clear and moist. No oropharyngeal exudate.   Eyes: Pupils are equal, round, and reactive to light. Conjunctivae and EOM are normal. Right eye exhibits no discharge. Left eye exhibits no discharge. No scleral icterus.   Neck: Normal range of motion. Neck supple. No tracheal deviation present. No thyromegaly present.   Cardiovascular: Normal rate, regular rhythm and normal heart sounds. Exam reveals no gallop and no friction rub.   No murmur heard.  Pulmonary/Chest: Effort normal and breath sounds normal. No respiratory distress. She has no wheezes. She has no rales. Right breast exhibits no mass, no nipple discharge, no skin change and no tenderness. Left breast exhibits no mass, no nipple discharge, no skin change and no tenderness.   Abdominal: Soft. She exhibits no distension and no mass. There is no abdominal tenderness. There is no rebound and no guarding.   Musculoskeletal:         General: No edema.   Lymphadenopathy:     She has no cervical adenopathy.     She has  no axillary adenopathy.        Right axillary: No pectoral and no lateral adenopathy present.        Left axillary: No pectoral and no lateral adenopathy present.  Neurological: She is alert and oriented to person, place, and time. No cranial nerve deficit.   Normal monofilament test  Sensation to light touch and monofilament intact in bilateral upper extremities  Head tremor noted on exam today   Skin: Skin is warm and dry. No rash noted. She is not diaphoretic. No erythema. No pallor.   Psychiatric: She has a normal mood and affect. Her behavior is normal. Thought content normal.   Nursing note and vitals reviewed.        Marie was seen today for office visit and medicare wellness visit.    Diagnoses and all orders for this visit:    Mild intermittent asthma without complication  -     CBC WITH DIFFERENTIAL  -     COMPREHENSIVE METABOLIC PANEL  -     GLYCOHEMOGLOBIN  -     LIPID PANEL WITH REFLEX  -     MICROALBUMIN URINE RANDOM  -     THYROID STIMULATING HORMONE REFLEX  -     URINALYSIS & REFLEX MICROSCOPY    Type 2 diabetes mellitus without complication, without long-term current use of insulin (CMS/[facility])  -     CBC WITH DIFFERENTIAL  -     COMPREHENSIVE METABOLIC PANEL  -     GLYCOHEMOGLOBIN  -     LIPID PANEL WITH REFLEX  -     MICROALBUMIN URINE RANDOM  -     THYROID STIMULATING HORMONE REFLEX  -     URINALYSIS & REFLEX MICROSCOPY    Hypothyroidism, unspecified type  -     CBC WITH DIFFERENTIAL  -     COMPREHENSIVE METABOLIC PANEL  -     GLYCOHEMOGLOBIN  -     LIPID PANEL WITH REFLEX  -     MICROALBUMIN URINE RANDOM  -     THYROID STIMULATING HORMONE REFLEX  -     URINALYSIS & REFLEX MICROSCOPY      Type 2 DM - Last A1C 7.8%. Taking metformin 1g BID, glimepiride 2mg daily. BGs 120-140 (highest).   - A1C and microabumin today   - Referral to ophtho for eye exam     Hypothyroidism - Taking levothyroxine 137mcg x 5 days per week   - TSH/reflex today    Mild intermittent asthma - Taking low dose Advair, Proair  prn. Does not take Advair in warmer months (summer). Denies SOB or wheezing aside from when there is a chill in the air. Rarely uses Proair.    - Continue same regimen     Brachioradial puritis - Occurs mostly at night. Sensation of ants crawling resolved, now just an itch. Ice pack helps. Using capsacin cream in AM after her shower. Overall improving. Denies fevers, night sweats or unintentional weight loss. No right shoulder pain.    - Given improvement and no red flag signs, CPM    Head tremor   - Chronic, failed meds, not interested in therapy    Colonoscopy 8/2020 (Dr. Goodson) - Diverticulosis, internal hemorrhoids. Repeat colonoscopy not recommended due to age.  Mammogram 8/2019 - Negative --> Referral for another now  Did not do DEXA scan last year --> Referral now  Paps had all been normal - No further paps 2/2 age  UTD on both PNA shot  Tdap 2012  Never had flu shot and does not want one  Did not get shingles shot because she saw a man at [employer] screaming from the pain from the shot  Fasting labs today    Medical compliance with plan discussed and risks of non-compliance reviewed.    Patient education completed on disease process, etiology & prognosis.    Patient expresses understanding of the plan.     Vandana Rueda MD     Allergic Rx

## 2020-10-04 ENCOUNTER — EMERGENCY (EMERGENCY)
Facility: HOSPITAL | Age: 30
LOS: 1 days | Discharge: HOME | End: 2020-10-04
Attending: EMERGENCY MEDICINE | Admitting: EMERGENCY MEDICINE
Payer: COMMERCIAL

## 2020-10-04 VITALS
SYSTOLIC BLOOD PRESSURE: 117 MMHG | RESPIRATION RATE: 18 BRPM | TEMPERATURE: 98 F | HEIGHT: 65 IN | OXYGEN SATURATION: 100 % | HEART RATE: 79 BPM | DIASTOLIC BLOOD PRESSURE: 74 MMHG

## 2020-10-04 PROCEDURE — 99284 EMERGENCY DEPT VISIT MOD MDM: CPT

## 2020-10-04 NOTE — ED ADULT TRIAGE NOTE - CHIEF COMPLAINT QUOTE
PT C/O Right lower extremity swelling x 1 day. Denies CP, palpitations, fevers, trauma, injury, falls. Pt ambulatory, appears comfortable.

## 2020-10-04 NOTE — ED ADULT NURSE NOTE - OBJECTIVE STATEMENT
patient received to room 24. a&Ox3. ambulatory. C/O of left leg swelling. pulses present distal to swelling. no redness or warmth noted. denies any trauma or long trips chest pain nausea vomiting hematuria dysuria diarrhea SOB. respirations even and unlabored. VSS. awaiting further orders. will continue to monitor.

## 2020-10-05 VITALS
OXYGEN SATURATION: 99 % | DIASTOLIC BLOOD PRESSURE: 60 MMHG | TEMPERATURE: 98 F | SYSTOLIC BLOOD PRESSURE: 100 MMHG | HEART RATE: 70 BPM | RESPIRATION RATE: 16 BRPM

## 2020-10-05 PROCEDURE — 93971 EXTREMITY STUDY: CPT | Mod: 26,LT

## 2020-10-05 NOTE — ED PROVIDER NOTE - PATIENT PORTAL LINK FT
You can access the FollowMyHealth Patient Portal offered by [facility] by registering at the following website: [email or web address]. By joining [facility]’s FollowMyHealth portal, you will also be able to view your health information using other applications (apps) compatible with our system.

## 2020-10-05 NOTE — ED PROVIDER NOTE - CLINICAL SUMMARY MEDICAL DECISION MAKING FREE TEXT BOX
Pmhx of angioedema presents with now resolved swelling of RT leg and throat dryness. VSS. No significant findings on exam. Educated about return precautions. Could have been a muscle cramp. Return precautions discussed.

## 2020-10-05 NOTE — ED PROVIDER NOTE - OBJECTIVE STATEMENT
29 yo F with pmhx angioedema presents with now resolved RT leg swelling 2 hours ago. States she was walking when she started feeling tightness in her RT leg. Felt leg was really heavy. Also mouth dryness. Follows up with immunologist for hx of angioedema - unk cause. Carries epipen with her, but never needed to use it. Takes Xolair once a week, xyzel and famotidine everyday.  Called her immunologist today after sxs started. Was told to take prednisone 40mg that helped with her sxs.   Denies cp, sob, abd pain, nausea, vomiting, diarrhea, urinary complains, cough, f/c, throat closing sensation.

## 2020-10-05 NOTE — ED PROVIDER NOTE - ATTENDING CONTRIBUTION TO CARE
Dr. Harper:  I have personally performed a face to face bedside history and physical examination of this patient. I have discussed the history, examination, review of systems, assessment and plan of management with the resident. I have reviewed the electronic medical record and amended it to reflect my history, review of systems, physical exam, assessment and plan.    30F h/o angioedema presents with transient episode of RLE swelling/heaviness earlier today.  Pt states she was ambulating and suddenly felt a heaviness and tightness in RLE with associated mouth dryness.  Called her immunologist, who recommended a dose of prednisone 40mg.  Now feels improved.      Exam:  - nad  - rrr  - ctab   -abd soft ntnd  - ?trace R calf swelling compared to left, neurovascularly intact    A/P  - R leg swelling/heaviness, r/o DVT, possibly immunologically related  - US RLE

## 2020-10-05 NOTE — ED PROVIDER NOTE - NSFOLLOWUPINSTRUCTIONS_ED_ALL_ED_FT
You were seen for leg swelling that was resolved at presentation. You had an ultrasound to check for clot that was negative. Please follow up with your immunologist and primary care physician for continue care.     If you had labs or imaging done, you were given copies of all of the available results. If anything is pending to result or pending official read, you will receive a call if results are positive.  If needed, call patient access services at [phone] to find a primary care doctor, or call at [phone] to make an appointment at the clinic.  Return to the ER for any worsening symptoms or concerns, including chest pain, shortness of breath, fever, chills.

## 2021-01-05 ENCOUNTER — APPOINTMENT (OUTPATIENT)
Dept: NEUROLOGY | Facility: CLINIC | Age: 31
End: 2021-01-05

## 2021-03-01 NOTE — ED PROVIDER NOTE - PHYSICAL EXAMINATION
Gen: well developed and well nourished, NAD  ENT: airway patent, mmm, oral cavity and pharynx normal. No inflammation, swelling, exudate, or lesions.   CV: RRR, +S1/S2, no M/R/G  Resp: CTAB, symmetric breath sounds, no W/R/R  GI: abdomen soft non-distended, NTTP  Extremities - FROM, symmetric pulses, no edema, 5/5 strength, sensation intact, no swelling noted, no ttp of lower extremities   Neuro: A&Ox3, following commands, speech clear, moving all four extremities spontaneously  Psych: appropriate mood, normal insight Statement Selected

## 2021-07-06 NOTE — ED PROVIDER NOTE - NS ED MD DISPO DISCHARGE
HPI  Allison Charlton is a 78 y.o. female who is here for follow up severe pain especially in the right shoulder and arm.  Apparently previous diagnosis of nerve disorder.  Reports tremor and shaking of the right arm area.  Complains of severe pain and is requesting doubling pain medication.  Risks versus benefits discussed and recommend changing to 1 every 4 hours as needed instead.  Reports because of pain unable to eat and weight loss noted.  Is being seen by orthopedist and apparently possible shoulder surgery discussed.  Obviously this would be of some risk with history of reflux dystrophy etc.      Review of Systems   Constitutional: Positive for activity change, appetite change and unexpected weight change.   Musculoskeletal: Positive for arthralgias and neck pain.   Psychiatric/Behavioral: Positive for dysphoric mood.   All other systems reviewed and are negative.        Past Medical History:   Diagnosis Date   • Abdominal pain    • Arthritis    • Asthma    • Bowel trouble    • COPD (chronic obstructive pulmonary disease) (CMS/HCC)    • Drug therapy    • Fatigue    • Gastrointestinal parasites    • History of transfusion    • Left foot pain    • Meige syndrome (blepharospasm with oromandibular dystonia)    • Scleroderma (CMS/HCC)    • Stroke (CMS/HCC)        Past Surgical History:   Procedure Laterality Date   • APPENDECTOMY     • BREAST BIOPSY     • BREAST CYST ASPIRATION     • COLON SURGERY     • COLONOSCOPY N/A 8/6/2018    Procedure: COLONOSCOPY TO CECUM WITH HOT SNARE POLYPECTOMY AND COLD BIOPSIES;  Surgeon: Hayden Wall MD;  Location: [facility] ENDOSCOPY;  Service: General   • CRANIOPLASTY     • FOOT SURGERY Right    • HYSTERECTOMY     • KNEE SURGERY Left    • OOPHORECTOMY     • THYROID BIOPSY     • TONSILLECTOMY         Family History   Problem Relation Age of Onset   • Cancer Mother    • Breast cancer Mother    • Cancer Father    • Cancer Sister    • Breast cancer Sister    • Cancer Maternal Aunt     • Breast cancer Maternal Aunt        Social History     Socioeconomic History   • Marital status:      Spouse name: Not on file   • Number of children: Not on file   • Years of education: Not on file   • Highest education level: Not on file   Tobacco Use   • Smoking status: Current Every Day Smoker     Years: 50.00   • Smokeless tobacco: Never Used   Substance and Sexual Activity   • Alcohol use: No   • Drug use: No   • Sexual activity: Defer       Vitals:    07/06/21 0929   BP: 108/70   Resp: 16        Body mass index is 19.54 kg/m².      Physical Exam  Vitals and nursing note reviewed.   Constitutional:       General: She is not in acute distress.     Appearance: She is well-developed. She is ill-appearing.   HENT:      Head: Normocephalic and atraumatic.   Eyes:      Conjunctiva/sclera: Conjunctivae normal.      Pupils: Pupils are equal, round, and reactive to light.   Neck:      Thyroid: No thyromegaly.   Cardiovascular:      Rate and Rhythm: Normal rate and regular rhythm.      Heart sounds: Normal heart sounds.   Pulmonary:      Effort: Pulmonary effort is normal. No respiratory distress.      Breath sounds: Normal breath sounds.   Abdominal:      General: There is no distension.      Palpations: Abdomen is soft. There is no mass.      Tenderness: There is no abdominal tenderness.      Hernia: No hernia is present.   Musculoskeletal:         General: No tenderness or deformity. Normal range of motion.      Cervical back: Normal range of motion.   Lymphadenopathy:      Cervical: No cervical adenopathy.   Skin:     General: Skin is warm and dry.      Coloration: Skin is not pale.      Findings: No rash.   Neurological:      Mental Status: She is alert and oriented to person, place, and time.      Motor: No abnormal muscle tone.      Coordination: Coordination normal.   Psychiatric:         Mood and Affect: Mood is depressed.         Speech: Speech normal.         Behavior: Behavior normal.          Thought Content: Thought content normal.         Cognition and Memory: Cognition normal.         Judgment: Judgment normal.           Assessment/Plan    Diagnoses and all orders for this visit:    1. Spondylosis of cervical region without myelopathy or radiculopathy (Primary)  -     HYDROcodone-acetaminophen (NORCO)  MG per tablet; Take 1 tablet by mouth Every 4 (Four) Hours As Needed for Moderate Pain .  Dispense: 120 tablet; Refill: 0  -     XR Spine Cervical 2 or 3 View; Future    2. Cervical sympathetic dystrophy  -     XR Spine Cervical 2 or 3 View; Future    3. Mixed anxiety depressive disorder    4. High risk medication use    5. Multinodular goiter  -     US Thyroid; Future    6. Generalized osteoarthritis    7. History of TIA (transient ischemic attack)    8. Depression, unspecified depression type      Patient here for follow-up of multiple medical issues.  Chronic pain and discussed increasing pain medication, risks versus benefits.  Weight loss related to poor appetite she relates to pain.  Has tremors and jerking right arm apparently related to nerve damage disorder.  All of this was discussed.  Patient does have a nodule in the right thyroid and repeat ultrasound recommended.  Also will get x-ray of the neck as discussed.  Otherwise follow-up appointment in 3 months at which time also due for Medicare wellness evaluation.    This note includes information entered using a voice recognition dictation system.  Though reviewed, some nonsensible errors may remain.         Home

## 2021-09-23 ENCOUNTER — RESULT REVIEW (OUTPATIENT)
Age: 31
End: 2021-09-23

## 2021-10-01 ENCOUNTER — TRANSCRIPTION ENCOUNTER (OUTPATIENT)
Age: 31
End: 2021-10-01

## 2021-10-05 ENCOUNTER — TRANSCRIPTION ENCOUNTER (OUTPATIENT)
Age: 31
End: 2021-10-05

## 2021-12-28 ENCOUNTER — RESULT REVIEW (OUTPATIENT)
Age: 31
End: 2021-12-28

## 2022-03-09 ENCOUNTER — APPOINTMENT (OUTPATIENT)
Dept: ANTEPARTUM | Facility: CLINIC | Age: 32
End: 2022-03-09

## 2022-03-12 ENCOUNTER — APPOINTMENT (OUTPATIENT)
Dept: RADIOLOGY | Facility: IMAGING CENTER | Age: 32
End: 2022-03-12

## 2022-03-17 ENCOUNTER — ASOB RESULT (OUTPATIENT)
Age: 32
End: 2022-03-17

## 2022-03-17 ENCOUNTER — APPOINTMENT (OUTPATIENT)
Dept: ANTEPARTUM | Facility: CLINIC | Age: 32
End: 2022-03-17
Payer: COMMERCIAL

## 2022-03-17 PROCEDURE — 76811 OB US DETAILED SNGL FETUS: CPT

## 2022-03-29 ENCOUNTER — APPOINTMENT (OUTPATIENT)
Dept: MATERNAL FETAL MEDICINE | Facility: CLINIC | Age: 32
End: 2022-03-29

## 2022-05-18 ENCOUNTER — EMERGENCY (EMERGENCY)
Facility: HOSPITAL | Age: 32
LOS: 1 days | Discharge: STILL ADMITTED OR ADMINISTRATIVE | End: 2022-05-18
Admitting: EMERGENCY MEDICINE
Payer: SELF-PAY

## 2022-05-18 ENCOUNTER — OUTPATIENT (OUTPATIENT)
Dept: INPATIENT UNIT | Facility: HOSPITAL | Age: 32
LOS: 1 days | Discharge: HOME | End: 2022-05-18
Payer: COMMERCIAL

## 2022-05-18 VITALS
SYSTOLIC BLOOD PRESSURE: 117 MMHG | HEART RATE: 80 BPM | DIASTOLIC BLOOD PRESSURE: 70 MMHG | RESPIRATION RATE: 16 BRPM | TEMPERATURE: 98 F

## 2022-05-18 VITALS — HEART RATE: 81 BPM | DIASTOLIC BLOOD PRESSURE: 61 MMHG | SYSTOLIC BLOOD PRESSURE: 116 MMHG

## 2022-05-18 VITALS
HEART RATE: 89 BPM | OXYGEN SATURATION: 100 % | RESPIRATION RATE: 16 BRPM | TEMPERATURE: 97 F | HEIGHT: 65 IN | DIASTOLIC BLOOD PRESSURE: 74 MMHG | SYSTOLIC BLOOD PRESSURE: 115 MMHG

## 2022-05-18 DIAGNOSIS — O26.899 OTHER SPECIFIED PREGNANCY RELATED CONDITIONS, UNSPECIFIED TRIMESTER: ICD-10-CM

## 2022-05-18 DIAGNOSIS — Z3A.00 WEEKS OF GESTATION OF PREGNANCY NOT SPECIFIED: ICD-10-CM

## 2022-05-18 PROCEDURE — L9996: CPT

## 2022-05-18 PROCEDURE — 99202 OFFICE O/P NEW SF 15 MIN: CPT

## 2022-05-18 RX ORDER — CETIRIZINE HYDROCHLORIDE 10 MG/1
0 TABLET ORAL
Qty: 0 | Refills: 0 | DISCHARGE

## 2022-05-18 NOTE — OB PROVIDER TRIAGE NOTE - HISTORY OF PRESENT ILLNESS
32y  at 30w5d presents to triage c/o feeling when she woke up from sleep. Denies any other episode  Reports +FM, no vaginal bleeding, no ROM or LOF  Prenatal care: Dr Hernandez; Denies any prenatal complications

## 2022-05-18 NOTE — OB PROVIDER TRIAGE NOTE - NSHPPHYSICALEXAM_GEN_ALL_CORE
T(C): 36.7 (05-18-22 @ 00:59), Max: 36.7 (05-18-22 @ 00:59)  HR: 81 (05-18-22 @ 01:42) (80 - 89)  BP: 116/61 (05-18-22 @ 01:42) (115/74 - 117/70)  RR: 16 (05-18-22 @ 00:59) (16 - 16)  SpO2: 100% (05-18-22 @ 00:25) (100% - 100%)    Heart: RRR  Lungs: CTA  Abdomen: Gravid, soft, NT    NST: Reactive with moderate variability, Category 1 tracing  [place]: No contractions  SSE: no pooling, nitrazine neg, fern neg          Cervix appears long and closed  TAS: SLIUP, Cephalic, Post placenta, RADHA: 16, BPP 8/8

## 2022-05-18 NOTE — OB PROVIDER TRIAGE NOTE - NSOBPROVIDERNOTE_OBGYN_ALL_OB_FT
32y  at 30w5d, no evidence of LOF or ROM  SSE done negative, BPP: RADHA adequate  D/w Dr Nash  D/c home with instructions   labor precautions  Pt to monitor fetal kick counts  Pt to follow up with OB as scheduled on Friday

## 2022-05-18 NOTE — ED ADULT TRIAGE NOTE - CHIEF COMPLAINT QUOTE
approximately 7 months pregnant c/o "leaking fluid" x 2 hours. states is leaking out very slowly. denies pain. due date 7/22

## 2022-05-18 NOTE — OB PROVIDER TRIAGE NOTE - PLAN OF CARE
D/w Dr Nash  D/c home with instructions   labor precautions  Pt to monitor fetal kick counts  Pt to follow up with OB as scheduled on Friday

## 2022-06-02 ENCOUNTER — OUTPATIENT (OUTPATIENT)
Dept: INPATIENT UNIT | Facility: HOSPITAL | Age: 32
LOS: 1 days | Discharge: HOME | End: 2022-06-02
Payer: COMMERCIAL

## 2022-06-02 VITALS
SYSTOLIC BLOOD PRESSURE: 127 MMHG | TEMPERATURE: 99 F | HEART RATE: 93 BPM | DIASTOLIC BLOOD PRESSURE: 67 MMHG | RESPIRATION RATE: 17 BRPM

## 2022-06-02 DIAGNOSIS — O26.899 OTHER SPECIFIED PREGNANCY RELATED CONDITIONS, UNSPECIFIED TRIMESTER: ICD-10-CM

## 2022-06-02 DIAGNOSIS — Z3A.00 WEEKS OF GESTATION OF PREGNANCY NOT SPECIFIED: ICD-10-CM

## 2022-06-02 LAB
APPEARANCE UR: CLEAR — SIGNIFICANT CHANGE UP
BACTERIA # UR AUTO: ABNORMAL
BILIRUB UR-MCNC: NEGATIVE — SIGNIFICANT CHANGE UP
COLOR SPEC: YELLOW — SIGNIFICANT CHANGE UP
DIFF PNL FLD: NEGATIVE — SIGNIFICANT CHANGE UP
EPI CELLS # UR: 3 /HPF — SIGNIFICANT CHANGE UP (ref 0–5)
GLUCOSE UR QL: NEGATIVE — SIGNIFICANT CHANGE UP
HYALINE CASTS # UR AUTO: 2 /LPF — SIGNIFICANT CHANGE UP (ref 0–7)
KETONES UR-MCNC: NEGATIVE — SIGNIFICANT CHANGE UP
LEUKOCYTE ESTERASE UR-ACNC: NEGATIVE — SIGNIFICANT CHANGE UP
NITRITE UR-MCNC: POSITIVE
PH UR: 7.5 — SIGNIFICANT CHANGE UP (ref 5–8)
PROT UR-MCNC: ABNORMAL
RBC CASTS # UR COMP ASSIST: 2 /HPF — SIGNIFICANT CHANGE UP (ref 0–4)
SP GR SPEC: 1.02 — SIGNIFICANT CHANGE UP (ref 1–1.05)
UROBILINOGEN FLD QL: SIGNIFICANT CHANGE UP
WBC UR QL: 10 /HPF — HIGH (ref 0–5)

## 2022-06-02 PROCEDURE — 76818 FETAL BIOPHYS PROFILE W/NST: CPT | Mod: 26

## 2022-06-02 PROCEDURE — 76817 TRANSVAGINAL US OBSTETRIC: CPT | Mod: 26

## 2022-06-02 PROCEDURE — 99214 OFFICE O/P EST MOD 30 MIN: CPT | Mod: 25

## 2022-06-02 NOTE — OB PROVIDER TRIAGE NOTE - NS_DISCHARGEPRINT_OBGYN_ALL_OB
Spoke to pharmacy and informed them of below message.  Clementina COOLEY CMA ([facility])      OB Discharge Instructions

## 2022-06-02 NOTE — OB PROVIDER TRIAGE NOTE - HISTORY OF PRESENT ILLNESS
33yo Black female  @ 32.6 wks SLIUP uncomp PNC here complaining of spotting a 12:30pm. Pt is complaining of low back pain that has been on/off for several weeks. Pt denies any hx LLP/ previa. No recent sexual intercourse. Pt reports that approximately 2.5 wks ago she was told she had a UTI and was Rx abx's. Pt got the medication but never took it believing with time and increased hydration it would resolved. Pt denies fever/ chills/ nausea. Pt reports some irritability with urination but denies hematuria/ urgency. Pt reports GFM and denies ctx's.    Pmhx-mast cell activation syndrome; benign thyroid nodule  Pshx/Hosp-denies  Meds-PNV  NKDA  Past ob-2010-5#13  FT complicated by SGA  Gyn-denies  Soc-denies

## 2022-06-02 NOTE — OB PROVIDER TRIAGE NOTE - NSHPPHYSICALEXAM_GEN_ALL_CORE
Gen: A&O x 3; NAD  Vitals: BP-127/67; P-93; T-37.1    Pulm-CTA B/L; no wheezes  Cor-clear S1S2  Abd exam-soft and nontender; CVA tenderness is negative    TAS-vtx; posterior placenta; RADHA-17.57; 8/8 BPP  SSE-os appears closed. +leukorrhea; no evidence of a bleed  TVS-3.9-4.1 cm; no funnel/ dynamic change. No placenta in TYE    +two small hemorrhoids     Blood type: B+

## 2022-06-02 NOTE — OB PROVIDER TRIAGE NOTE - NSOBPROVIDERNOTE_OBGYN_ALL_OB_FT
31yo Black female  @ 32.6 wks SLIUP uncomp PNC here with spotting  -SSE is negative for vaginal spotting  -pt with 2 hemorrhoids  -blood type is B+  -pt with UTI 2.5 wks ago, pt never took abx  -UA 33yo Black female  @ 32.6 wks SLIUP uncomp PNC here with spotting  -SSE is negative for vaginal spotting  -pt with 2 hemorrhoids  -blood type is B+  -pt with UTI 2.5 wks ago, pt never took abx  -UA reveals +nitrites  -pt was dc Dr Irwin. Pt was counseled on importance of taking her abx's  -UCx sent  -pt was dw home and instructed to return if fever/ chills/ increased lower back pain/ other signs of infection develop  -pt was dc home with instructions to follow up in office in 1 week

## 2022-06-02 NOTE — OB PROVIDER TRIAGE NOTE - NSHPLABSRESULTS_GEN_ALL_CORE
Urinalysis Basic - ( 2022 14:20 )    Color: Yellow / Appearance: Clear / S.020 / pH: x  Gluc: x / Ketone: Negative  / Bili: Negative / Urobili: <2 mg/dL   Blood: x / Protein: Trace / Nitrite: Positive   Leuk Esterase: Negative / RBC: 2 /HPF / WBC 10 /HPF   Sq Epi: x / Non Sq Epi: 3 /HPF / Bacteria: Moderate

## 2022-06-06 LAB
CULTURE RESULTS: SIGNIFICANT CHANGE UP
SPECIMEN SOURCE: SIGNIFICANT CHANGE UP

## 2022-06-28 NOTE — ED ADULT NURSE NOTE - HOW OFTEN DO YOU HAVE A DRINK CONTAINING ALCOHOL?
[FreeTextEntry1] : TTE 6/3/22\par  1. Limited study obtained for evaluation of pericardial effusion.\par  2. Normal left ventricular systolic function.\par  3. Normal right ventricular size and systolic function.\par  4. Pulmonary artery systolic pressure is 30 mmHg.\par  5. Trivial pericardial effusion.\par  6. Left pleural effusion.\par  7. Compared to the previous TTE performed on 5/23/2022, the pericardial \par effusion has decreased in size.
Never

## 2022-07-06 ENCOUNTER — TRANSCRIPTION ENCOUNTER (OUTPATIENT)
Age: 32
End: 2022-07-06

## 2022-07-07 ENCOUNTER — INPATIENT (INPATIENT)
Facility: HOSPITAL | Age: 32
LOS: 1 days | Discharge: HOME | End: 2022-07-09
Attending: OBSTETRICS & GYNECOLOGY | Admitting: OBSTETRICS & GYNECOLOGY

## 2022-07-07 ENCOUNTER — RESULT REVIEW (OUTPATIENT)
Age: 32
End: 2022-07-07

## 2022-07-07 ENCOUNTER — TRANSCRIPTION ENCOUNTER (OUTPATIENT)
Age: 32
End: 2022-07-07

## 2022-07-07 VITALS — DIASTOLIC BLOOD PRESSURE: 89 MMHG | SYSTOLIC BLOOD PRESSURE: 137 MMHG | HEART RATE: 82 BPM

## 2022-07-07 DIAGNOSIS — O26.619 LIVER AND BILIARY TRACT DISORDERS IN PREGNANCY, UNSPECIFIED TRIMESTER: ICD-10-CM

## 2022-07-07 LAB
BASOPHILS # BLD AUTO: 0.04 K/UL — SIGNIFICANT CHANGE UP (ref 0–0.2)
BASOPHILS NFR BLD AUTO: 0.4 % — SIGNIFICANT CHANGE UP (ref 0–2)
BLD GP AB SCN SERPL QL: NEGATIVE — SIGNIFICANT CHANGE UP
COVID-19 SPIKE DOMAIN AB INTERP: POSITIVE
COVID-19 SPIKE DOMAIN ANTIBODY RESULT: >250 U/ML — HIGH
EOSINOPHIL # BLD AUTO: 0.52 K/UL — HIGH (ref 0–0.5)
EOSINOPHIL NFR BLD AUTO: 5.4 % — SIGNIFICANT CHANGE UP (ref 0–6)
HCT VFR BLD CALC: 33.2 % — LOW (ref 34.5–45)
HGB BLD-MCNC: 10 G/DL — LOW (ref 11.5–15.5)
IANC: 6.08 K/UL — SIGNIFICANT CHANGE UP (ref 1.8–7.4)
IMM GRANULOCYTES NFR BLD AUTO: 0.5 % — SIGNIFICANT CHANGE UP (ref 0–1.5)
LYMPHOCYTES # BLD AUTO: 2.18 K/UL — SIGNIFICANT CHANGE UP (ref 1–3.3)
LYMPHOCYTES # BLD AUTO: 22.6 % — SIGNIFICANT CHANGE UP (ref 13–44)
MCHC RBC-ENTMCNC: 21.1 PG — LOW (ref 27–34)
MCHC RBC-ENTMCNC: 30.1 GM/DL — LOW (ref 32–36)
MCV RBC AUTO: 70.2 FL — LOW (ref 80–100)
MONOCYTES # BLD AUTO: 0.79 K/UL — SIGNIFICANT CHANGE UP (ref 0–0.9)
MONOCYTES NFR BLD AUTO: 8.2 % — SIGNIFICANT CHANGE UP (ref 2–14)
NEUTROPHILS # BLD AUTO: 6.08 K/UL — SIGNIFICANT CHANGE UP (ref 1.8–7.4)
NEUTROPHILS NFR BLD AUTO: 62.9 % — SIGNIFICANT CHANGE UP (ref 43–77)
NRBC # BLD: 0 /100 WBCS — SIGNIFICANT CHANGE UP
NRBC # FLD: 0 K/UL — SIGNIFICANT CHANGE UP
PLATELET # BLD AUTO: 237 K/UL — SIGNIFICANT CHANGE UP (ref 150–400)
RBC # BLD: 4.73 M/UL — SIGNIFICANT CHANGE UP (ref 3.8–5.2)
RBC # FLD: 15.5 % — HIGH (ref 10.3–14.5)
RH IG SCN BLD-IMP: POSITIVE — SIGNIFICANT CHANGE UP
RH IG SCN BLD-IMP: POSITIVE — SIGNIFICANT CHANGE UP
SARS-COV-2 IGG+IGM SERPL QL IA: >250 U/ML — HIGH
SARS-COV-2 IGG+IGM SERPL QL IA: POSITIVE
T PALLIDUM AB TITR SER: NEGATIVE — SIGNIFICANT CHANGE UP
WBC # BLD: 9.66 K/UL — SIGNIFICANT CHANGE UP (ref 3.8–10.5)
WBC # FLD AUTO: 9.66 K/UL — SIGNIFICANT CHANGE UP (ref 3.8–10.5)

## 2022-07-07 PROCEDURE — 88307 TISSUE EXAM BY PATHOLOGIST: CPT | Mod: 26

## 2022-07-07 RX ORDER — CHLORHEXIDINE GLUCONATE 213 G/1000ML
1 SOLUTION TOPICAL ONCE
Refills: 0 | Status: DISCONTINUED | OUTPATIENT
Start: 2022-07-07 | End: 2022-07-07

## 2022-07-07 RX ORDER — FAMOTIDINE 10 MG/ML
20 INJECTION INTRAVENOUS ONCE
Refills: 0 | Status: COMPLETED | OUTPATIENT
Start: 2022-07-07 | End: 2022-07-07

## 2022-07-07 RX ORDER — TETANUS TOXOID, REDUCED DIPHTHERIA TOXOID AND ACELLULAR PERTUSSIS VACCINE, ADSORBED 5; 2.5; 8; 8; 2.5 [IU]/.5ML; [IU]/.5ML; UG/.5ML; UG/.5ML; UG/.5ML
0.5 SUSPENSION INTRAMUSCULAR ONCE
Refills: 0 | Status: DISCONTINUED | OUTPATIENT
Start: 2022-07-07 | End: 2022-07-09

## 2022-07-07 RX ORDER — LANOLIN
1 OINTMENT (GRAM) TOPICAL EVERY 6 HOURS
Refills: 0 | Status: DISCONTINUED | OUTPATIENT
Start: 2022-07-07 | End: 2022-07-09

## 2022-07-07 RX ORDER — DIPHENHYDRAMINE HCL 50 MG
25 CAPSULE ORAL EVERY 6 HOURS
Refills: 0 | Status: DISCONTINUED | OUTPATIENT
Start: 2022-07-07 | End: 2022-07-09

## 2022-07-07 RX ORDER — OXYCODONE HYDROCHLORIDE 5 MG/1
5 TABLET ORAL ONCE
Refills: 0 | Status: DISCONTINUED | OUTPATIENT
Start: 2022-07-07 | End: 2022-07-09

## 2022-07-07 RX ORDER — DEXAMETHASONE 0.5 MG/5ML
4 ELIXIR ORAL EVERY 6 HOURS
Refills: 0 | Status: DISCONTINUED | OUTPATIENT
Start: 2022-07-07 | End: 2022-07-07

## 2022-07-07 RX ORDER — IBUPROFEN 200 MG
1 TABLET ORAL
Qty: 0 | Refills: 0 | DISCHARGE

## 2022-07-07 RX ORDER — CITRIC ACID/SODIUM CITRATE 300-500 MG
15 SOLUTION, ORAL ORAL EVERY 6 HOURS
Refills: 0 | Status: DISCONTINUED | OUTPATIENT
Start: 2022-07-07 | End: 2022-07-07

## 2022-07-07 RX ORDER — OXYTOCIN 10 UNIT/ML
333.33 VIAL (ML) INJECTION
Qty: 20 | Refills: 0 | Status: DISCONTINUED | OUTPATIENT
Start: 2022-07-07 | End: 2022-07-08

## 2022-07-07 RX ORDER — ONDANSETRON 8 MG/1
4 TABLET, FILM COATED ORAL EVERY 6 HOURS
Refills: 0 | Status: DISCONTINUED | OUTPATIENT
Start: 2022-07-07 | End: 2022-07-07

## 2022-07-07 RX ORDER — IBUPROFEN 200 MG
600 TABLET ORAL EVERY 6 HOURS
Refills: 0 | Status: COMPLETED | OUTPATIENT
Start: 2022-07-07 | End: 2023-06-05

## 2022-07-07 RX ORDER — BUTORPHANOL TARTRATE 2 MG/ML
0.12 INJECTION, SOLUTION INTRAMUSCULAR; INTRAVENOUS EVERY 6 HOURS
Refills: 0 | Status: DISCONTINUED | OUTPATIENT
Start: 2022-07-07 | End: 2022-07-07

## 2022-07-07 RX ORDER — OXYCODONE HYDROCHLORIDE 5 MG/1
5 TABLET ORAL
Refills: 0 | Status: COMPLETED | OUTPATIENT
Start: 2022-07-07 | End: 2022-07-14

## 2022-07-07 RX ORDER — ERTAPENEM SODIUM 1 G/1
1000 INJECTION, POWDER, LYOPHILIZED, FOR SOLUTION INTRAMUSCULAR; INTRAVENOUS EVERY 24 HOURS
Refills: 0 | Status: COMPLETED | OUTPATIENT
Start: 2022-07-07 | End: 2022-07-09

## 2022-07-07 RX ORDER — NALOXONE HYDROCHLORIDE 4 MG/.1ML
0.1 SPRAY NASAL
Refills: 0 | Status: DISCONTINUED | OUTPATIENT
Start: 2022-07-07 | End: 2022-07-07

## 2022-07-07 RX ORDER — MORPHINE SULFATE 50 MG/1
2 CAPSULE, EXTENDED RELEASE ORAL ONCE
Refills: 0 | Status: DISCONTINUED | OUTPATIENT
Start: 2022-07-07 | End: 2022-07-07

## 2022-07-07 RX ORDER — HEPARIN SODIUM 5000 [USP'U]/ML
5000 INJECTION INTRAVENOUS; SUBCUTANEOUS EVERY 12 HOURS
Refills: 0 | Status: DISCONTINUED | OUTPATIENT
Start: 2022-07-07 | End: 2022-07-09

## 2022-07-07 RX ORDER — SODIUM CHLORIDE 9 MG/ML
1000 INJECTION, SOLUTION INTRAVENOUS
Refills: 0 | Status: DISCONTINUED | OUTPATIENT
Start: 2022-07-07 | End: 2022-07-07

## 2022-07-07 RX ORDER — LORATADINE 10 MG/1
10 TABLET ORAL DAILY
Refills: 0 | Status: DISCONTINUED | OUTPATIENT
Start: 2022-07-07 | End: 2022-07-07

## 2022-07-07 RX ORDER — ACETAMINOPHEN 500 MG
2 TABLET ORAL
Qty: 0 | Refills: 0 | DISCHARGE

## 2022-07-07 RX ORDER — SODIUM CHLORIDE 9 MG/ML
1000 INJECTION, SOLUTION INTRAVENOUS
Refills: 0 | Status: DISCONTINUED | OUTPATIENT
Start: 2022-07-07 | End: 2022-07-08

## 2022-07-07 RX ORDER — ACETAMINOPHEN 500 MG
975 TABLET ORAL
Refills: 0 | Status: DISCONTINUED | OUTPATIENT
Start: 2022-07-07 | End: 2022-07-09

## 2022-07-07 RX ORDER — MAGNESIUM HYDROXIDE 400 MG/1
30 TABLET, CHEWABLE ORAL
Refills: 0 | Status: DISCONTINUED | OUTPATIENT
Start: 2022-07-07 | End: 2022-07-09

## 2022-07-07 RX ORDER — KETOROLAC TROMETHAMINE 30 MG/ML
30 SYRINGE (ML) INJECTION EVERY 6 HOURS
Refills: 0 | Status: DISCONTINUED | OUTPATIENT
Start: 2022-07-07 | End: 2022-07-08

## 2022-07-07 RX ORDER — SIMETHICONE 80 MG/1
80 TABLET, CHEWABLE ORAL EVERY 4 HOURS
Refills: 0 | Status: DISCONTINUED | OUTPATIENT
Start: 2022-07-07 | End: 2022-07-09

## 2022-07-07 RX ADMIN — FAMOTIDINE 20 MILLIGRAM(S): 10 INJECTION INTRAVENOUS at 19:42

## 2022-07-07 NOTE — OB NEONATOLOGY/PEDIATRICIAN DELIVERY SUMMARY - NSPEDSNEONOTESA_OBGYN_ALL_OB_FT
Baby is a 37.4 wk GA female born to a 35 y/o  mother via STAT C/S for Cat II and cord prolapse. Maternal history significant for idiopathic mast cell activation causing swelling, thryoid nodule and cholecystitis. Prenatal history notable for HPV+. Maternal BT B+. PNL neg, NR, and immune. GBS neg on , ***ROM at *** on ***, clear / mec / bloody fluids. Baby born vigorous and crying spontaneously. WDSS. Apgars 9/9. EOS ***. Mom plans to breastfeed, would like hepB. . COVID status negative. Baby is a 37.4 wk GA female born to a 33 y/o  mother via STAT C/S for Cat II and cord prolapse. Maternal history significant for idiopathic mast cell activation causing swelling, thryoid nodule and cholecystitis. Prenatal history notable for HPV+. Maternal BT B+. PNL neg, NR, and immune. GBS neg on (). Mother admitted for IOL for cholestasis. AROM  less than 1 hour PTD with  clear fluids and cord prolapse noted. Baby born vigorous and crying spontaneously. WDSS. Apgars 9/9. EOS ***. Mom plans to breastfeed, would like hepB. . COVID status negative. Baby is a 37.4 wk GA female born to a 33 y/o  mother via STAT C/S for Cat II and cord prolapse. Maternal history significant for idiopathic mast cell activation causing swelling, thryoid nodule, prior UTI treated with antibiotics, and cholecystitis. Prenatal history notable for HPV+. Maternal BT B+. PNL neg, NR, and immune. GBS neg on (). Mother admitted for IOL for cholestasis. SROM on  during L+D exam ~less than 1 hour PTD with  clear fluids and cord prolapse noted by OB. STAT C/S for prolapse. Infant emerged with fair color and initially no spontaneous cry. Infant with quick improvement in color and spontaneous cry noted while being brought to warmer.  WDSS. Apgars 9/9. EOS 0.13.  Mom plans to breastfeed, would like hepB. . COVID status negative.  Delivery attended by ANIA Mckeon, James Moore , Fellow , and Dr Sanon , Nicu Attending.

## 2022-07-07 NOTE — OB PROVIDER H&P - ASSESSMENT
A&P:   Labor: admit to L&D  - PO cytotec  - routine labs  - EFM/toco  - NPO, IV hydration  Fetal: cat 1 tracing, fetal status reassuring  GBS: neg  Analgesia: would like an epidural when she becomes more comfortable      Discussed with Dr. Luann Brand Panella PGY-1

## 2022-07-07 NOTE — OB PROVIDER H&P - HISTORY OF PRESENT ILLNESS
R1 H&P    Pt is a 31y/o  at 37w6d admitted for IOL for cholestasis (BA 15).  Prenatal course uncomplicated, pt was treated for a UTI with Keflex  GBS negative  EFW 2721    OBHx:   -  at 41w 1wz74ic  GynHx: H/o abnormal pap (HPV), no colposcopies, STI's, fibroids, cysts  PMHx: mast cell activation syndrome, benign thyroid nodule  PSHx: none  Med: ursodiol, zyrtec, xyzal, pepcid, PNV  All: NKDA  SH: denies alcohol, tobacco, or drug use  Psych: denies h/o anxiety or depression

## 2022-07-07 NOTE — DISCHARGE NOTE OB - HOSPITAL COURSE
IOL for cholestasis.  At 5 cm dilation patient's had SROM with cord prolapse.  Had emergent pLTCS.  Uncomplicated post op course.

## 2022-07-07 NOTE — OB RN DELIVERY SUMMARY - NSSELHIDDEN_OBGYN_ALL_OB_FT
[NS_DeliveryAttending1_OBGYN_ALL_OB_FT:[id number]],[NS_DeliveryRN_OBGYN_ALL_OB_FT:[id number]=],[NS_DeliveryAssist1_OBGYN_ALL_OB_FT:[id number]=]

## 2022-07-07 NOTE — OB PROVIDER H&P - NSHPPHYSICALEXAM_GEN_ALL_CORE
EFH: 120 / mod / +accels  [place]: irritable uterus  VE: 0/50/-3  TAUS: cephalic    General: Calm, comfortable, in NAD  Pulm: CTAB  Cardio: RRR, no M/R/G  Abd: nontender, no deformities

## 2022-07-07 NOTE — OB RN PATIENT PROFILE - FALL HARM RISK - UNIVERSAL INTERVENTIONS
Bed in lowest position, wheels locked, appropriate side rails in place/Call bell, personal items and telephone in reach/Instruct patient to call for assistance before getting out of bed or chair/Non-slip footwear when patient is out of bed/[place] to call system/Physically safe environment - no spills, clutter or unnecessary equipment/Purposeful Proactive Rounding/Room/bathroom lighting operational, light cord in reach

## 2022-07-07 NOTE — DISCHARGE NOTE OB - MEDICATION SUMMARY - MEDICATIONS TO TAKE
I will START or STAY ON the medications listed below when I get home from the hospital:    zyrtec  -- Indication: For Home med    Motrin 600 mg oral tablet  -- 1 tab(s) by mouth every 6 hours  -- Indication: For Pain    Tylenol 500 mg oral tablet  -- 2 tab(s) by mouth every 6 hours  -- Indication: For Pain    PNV Prenatal oral tablet  -- 1 tab(s) by mouth once a day  -- Indication: For Supplement

## 2022-07-07 NOTE — DISCHARGE NOTE OB - CARE PROVIDER_API CALL
Luz Irwin)  OBSGYN  Dept Director  [address] [place] [address], [address]  [place] [address]  Phone: [phone]  Fax: [phone]  Follow Up Time:

## 2022-07-07 NOTE — CHART NOTE - NSCHARTNOTEFT_GEN_A_CORE
OB Attending Note    Called to patient's room due to lack of contact with EFM and concern for fetal deceleration.   Patient examined and noted to have cervical balloon securely in place.  FHR re-located and noted to be wnl.  /mod mila/+accel, intermittent variable decels.  Patient placed in lateral tilt with O2.  IV fluid bolus initiated.   Will continue to closely monitor.  continue IOL.   Overall reassuring fetal status with moderate variability and accels.     KRISTOFER Irwin MD

## 2022-07-07 NOTE — DISCHARGE NOTE OB - MATERIALS PROVIDED
Vaccinations/[facility]  Screening Program/  Immunization Record/Breastfeeding Log/Breastfeeding Mother’s Support Group Information/Guide to Postpartum Care/[facility] Hearing Screen Program/Back To Sleep Handout/Shaken Baby Prevention Handout/Birth Certificate Instructions/Tdap Vaccination (VIS Pub Date: 2012)

## 2022-07-07 NOTE — DISCHARGE NOTE OB - NS MD DC FALL RISK RISK
For information on Fall & Injury Prevention, visit: [email or web address] OR  [email or web address] OR  https://www.cdc.gov/steadi/patient.html

## 2022-07-07 NOTE — OB PROVIDER DELIVERY SUMMARY - NSPROVIDERDELIVERYNOTE_OBGYN_ALL_OB_FT
Called to room for fetal deceleration.   On exam patient noted to be 5 cm.  SROM occurred on exam and cord prolapse identified.  FHR noted to be in the 90's.  Code OB called and  code 100.  Patient bought to OR with assistance of hand from below to elevated fetal head.   Emergency time out performed.  Emergent pLTCS performed after adequate anesthesia confirmed.   Viable female infant, apgars 9,9.  Weight 5lb 2oz.  Grossly normal b/l tubes, ovaries.  Grossly normal uterus.

## 2022-07-07 NOTE — DISCHARGE NOTE OB - PATIENT PORTAL LINK FT
You can access the FollowMyHealth Patient Portal offered by [facility] by registering at the following website: [email or web address]. By joining [employer]’s FollowMyHealth portal, you will also be able to view your health information using other applications (apps) compatible with our system.

## 2022-07-07 NOTE — OB RN DELIVERY SUMMARY - NS_SEPSISRSKCALC_OBGYN_ALL_OB_FT
EOS calculated successfully. EOS Risk Factor: 0.5/1000 live births ([facility] national incidence); GA=37w4d; Temp=98.96; ROM=0.15; GBS='Negative'; Antibiotics='No antibiotics or any antibiotics < 2 hrs prior to birth'

## 2022-07-07 NOTE — OB PROVIDER LABOR PROGRESS NOTE - ASSESSMENT
ICP IOL   unchanged exam  For epidural followed by cervical balloon placement  Continue PO cytotec  DW Dr Valdez luke, NP
-CB placed @ 430p  -continue PO  -c/w fhr/toco    per plan w dr dieter irvin pgy2

## 2022-07-07 NOTE — OB RN INTRAOPERATIVE NOTE - NSSELHIDDEN_OBGYN_ALL_OB_FT
[NS_DeliveryAttending1_OBGYN_ALL_OB_FT:[id number]],[NS_DeliveryRN_OBGYN_ALL_OB_FT:[id number]],[NS_DeliveryAssist1_OBGYN_ALL_OB_FT:[id number]=]

## 2022-07-08 LAB
BASOPHILS # BLD AUTO: 0.02 K/UL — SIGNIFICANT CHANGE UP (ref 0–0.2)
BASOPHILS NFR BLD AUTO: 0.1 % — SIGNIFICANT CHANGE UP (ref 0–2)
EOSINOPHIL # BLD AUTO: 0.04 K/UL — SIGNIFICANT CHANGE UP (ref 0–0.5)
EOSINOPHIL NFR BLD AUTO: 0.3 % — SIGNIFICANT CHANGE UP (ref 0–6)
HCT VFR BLD CALC: 26.6 % — LOW (ref 34.5–45)
HGB BLD-MCNC: 8 G/DL — LOW (ref 11.5–15.5)
IANC: 11.23 K/UL — HIGH (ref 1.8–7.4)
IMM GRANULOCYTES NFR BLD AUTO: 0.4 % — SIGNIFICANT CHANGE UP (ref 0–1.5)
LYMPHOCYTES # BLD AUTO: 1.35 K/UL — SIGNIFICANT CHANGE UP (ref 1–3.3)
LYMPHOCYTES # BLD AUTO: 9.8 % — LOW (ref 13–44)
MCHC RBC-ENTMCNC: 21.6 PG — LOW (ref 27–34)
MCHC RBC-ENTMCNC: 30.1 GM/DL — LOW (ref 32–36)
MCV RBC AUTO: 71.7 FL — LOW (ref 80–100)
MONOCYTES # BLD AUTO: 1.09 K/UL — HIGH (ref 0–0.9)
MONOCYTES NFR BLD AUTO: 7.9 % — SIGNIFICANT CHANGE UP (ref 2–14)
NEUTROPHILS # BLD AUTO: 11.23 K/UL — HIGH (ref 1.8–7.4)
NEUTROPHILS NFR BLD AUTO: 81.5 % — HIGH (ref 43–77)
NRBC # BLD: 0 /100 WBCS — SIGNIFICANT CHANGE UP
NRBC # FLD: 0 K/UL — SIGNIFICANT CHANGE UP
PLATELET # BLD AUTO: 182 K/UL — SIGNIFICANT CHANGE UP (ref 150–400)
RBC # BLD: 3.71 M/UL — LOW (ref 3.8–5.2)
RBC # FLD: 15.2 % — HIGH (ref 10.3–14.5)
WBC # BLD: 13.79 K/UL — HIGH (ref 3.8–10.5)
WBC # FLD AUTO: 13.79 K/UL — HIGH (ref 3.8–10.5)

## 2022-07-08 RX ORDER — OXYCODONE HYDROCHLORIDE 5 MG/1
5 TABLET ORAL
Refills: 0 | Status: DISCONTINUED | OUTPATIENT
Start: 2022-07-08 | End: 2022-07-09

## 2022-07-08 RX ADMIN — HEPARIN SODIUM 5000 UNIT(S): 5000 INJECTION INTRAVENOUS; SUBCUTANEOUS at 05:02

## 2022-07-08 RX ADMIN — Medication 30 MILLIGRAM(S): at 05:40

## 2022-07-08 RX ADMIN — Medication 975 MILLIGRAM(S): at 20:57

## 2022-07-08 RX ADMIN — Medication 30 MILLIGRAM(S): at 13:00

## 2022-07-08 RX ADMIN — Medication 30 MILLIGRAM(S): at 05:01

## 2022-07-08 RX ADMIN — SIMETHICONE 80 MILLIGRAM(S): 80 TABLET, CHEWABLE ORAL at 21:36

## 2022-07-08 RX ADMIN — Medication 30 MILLIGRAM(S): at 12:13

## 2022-07-08 RX ADMIN — Medication 30 MILLIGRAM(S): at 00:42

## 2022-07-08 RX ADMIN — ERTAPENEM SODIUM 120 MILLIGRAM(S): 1 INJECTION, POWDER, LYOPHILIZED, FOR SOLUTION INTRAMUSCULAR; INTRAVENOUS at 00:43

## 2022-07-08 RX ADMIN — HEPARIN SODIUM 5000 UNIT(S): 5000 INJECTION INTRAVENOUS; SUBCUTANEOUS at 18:04

## 2022-07-08 RX ADMIN — Medication 30 MILLIGRAM(S): at 18:04

## 2022-07-08 RX ADMIN — Medication 975 MILLIGRAM(S): at 19:57

## 2022-07-08 RX ADMIN — Medication 30 MILLIGRAM(S): at 01:07

## 2022-07-08 NOTE — PROGRESS NOTE ADULT - ASSESSMENT
A/P: 33yo POD#1 s/p LTCS.  Patient is stable and doing well post-operatively.    -Discussed events of delivery with patient.   Discussed cord prolapse and indication for emergent c/s.  All questions answered.   - Continue regular diet.  - Increase ambulation.  - Continue motrin, tylenol, oxycodone PRN for pain control.   - F/u AM NAVEED Irwin MD

## 2022-07-08 NOTE — LACTATION INITIAL EVALUATION - LACTATION INTERVENTIONS
reviewed  late    behavior/initiate/review safe skin-to-skin/initiate/review hand expression/initiate/review techniques for position and latch/review techniques to increase milk supply/initiate/review breast massage/compression/reviewed components of an effective feeding and at least 8 effective feedings per day required/reviewed importance of monitoring infant diapers, the breastfeeding log, and minimum output each day/reviewed risks of unnecessary formula supplementation/reviewed strategies to transition to breastfeeding only/reviewed benefits and recommendations for rooming in/reviewed feeding on demand/by cue at least 8 times a day/reviewed indications of inadequate milk transfer that would require supplementation

## 2022-07-09 VITALS
DIASTOLIC BLOOD PRESSURE: 64 MMHG | HEART RATE: 92 BPM | RESPIRATION RATE: 18 BRPM | TEMPERATURE: 98 F | OXYGEN SATURATION: 97 % | SYSTOLIC BLOOD PRESSURE: 117 MMHG

## 2022-07-09 RX ORDER — IBUPROFEN 200 MG
600 TABLET ORAL EVERY 6 HOURS
Refills: 0 | Status: DISCONTINUED | OUTPATIENT
Start: 2022-07-09 | End: 2022-07-09

## 2022-07-09 RX ADMIN — Medication 975 MILLIGRAM(S): at 10:39

## 2022-07-09 RX ADMIN — Medication 600 MILLIGRAM(S): at 15:30

## 2022-07-09 RX ADMIN — Medication 600 MILLIGRAM(S): at 05:57

## 2022-07-09 RX ADMIN — Medication 600 MILLIGRAM(S): at 06:57

## 2022-07-09 RX ADMIN — Medication 600 MILLIGRAM(S): at 14:50

## 2022-07-09 RX ADMIN — HEPARIN SODIUM 5000 UNIT(S): 5000 INJECTION INTRAVENOUS; SUBCUTANEOUS at 05:57

## 2022-07-09 RX ADMIN — ERTAPENEM SODIUM 120 MILLIGRAM(S): 1 INJECTION, POWDER, LYOPHILIZED, FOR SOLUTION INTRAMUSCULAR; INTRAVENOUS at 01:14

## 2022-07-09 RX ADMIN — Medication 975 MILLIGRAM(S): at 11:30

## 2022-07-09 NOTE — PROGRESS NOTE ADULT - SUBJECTIVE AND OBJECTIVE BOX
Pain Service Follow-up  Postop Day  1    S/P  C- Section    T(C): 36.7 (07-08-22 @ 06:00), Max: 37.2 (07-07-22 @ 11:07)  HR: 76 (07-08-22 @ 06:00) (61 - 104)  BP: 136/86 (07-08-22 @ 06:00) (102/81 - 162/76)  RR: 18 (07-08-22 @ 06:00) (15 - 19)  SpO2: 100% (07-08-22 @ 06:00) (79% - 100%)  Wt(kg): --      THERAPY:     S/P Epidural Morphine    Sedation Score:	  [X] Alert	      [  ] Drowsy       [  ] Arousable	[  ] Asleep         [  ] Unresponsive    Side Effects:	  [X] None	      [  ] Nausea       [  ] Pruritus        [  ] Weakness   [  ] Numbness        ASSESSMENT/ PLAN   [ X ] Discontinue         [  ] Continue    [ X ] Documentation and Verification of current medications       Satisfactory Post Anesthetic Course    
Attending Note     POD 2 s/p pLTCS   no complaints   pain adequately controlled   voiding freely   tolerating po + flatus   ambulating with no issues   voiding freely     Vital Signs Last 24 Hrs  T(C): 36.4 (09 Jul 2022 06:00), Max: 36.7 (08 Jul 2022 14:38)  T(F): 97.6 (09 Jul 2022 06:00), Max: 98.1 (08 Jul 2022 14:38)  HR: 79 (09 Jul 2022 06:00) (74 - 85)  BP: 123/66 (09 Jul 2022 06:00) (116/74 - 123/71)  BP(mean): --  RR: 18 (09 Jul 2022 06:00) (17 - 18)  SpO2: 100% (09 Jul 2022 06:00) (100% - 100%)    Parameters below as of 09 Jul 2022 06:00  Patient On (Oxygen Delivery Method): room air      Gen in NAD   Abd soft, fundus firm nontender  Incision c/d/i   Ext: no c/c/e     A/P: POD 2 s/p pLTCS   doing well   routine care  d/c home today   precautions reviewed 
OB Attending Progress Note:  Delivery, POD#1    S: 33yo POD#1 s/p LTCS . Her pain is well controlled. She is tolerating a regular diet.  Not yet passing flatus. Denies N/V. Denies CP/SOB/lightheadedness/dizziness.   She is ambulating without difficulty.   Voiding spontaneously         Labs:  Blood type: B Positive  Rubella IgG: RPR: Negative                          8.0<L>   13.79<H> >-----------< 182    (  @ 05:23 )             26.6<L>                        10.0<L>   9.66 >-----------< 237    (  @ 05:26 )             33.2<L>                  PE:  General: NAD  Abdomen: Mildly distended, appropriately tender, incision c/d/i.  Extremities: No erythema, no pitting edema

## 2022-08-26 LAB — SURGICAL PATHOLOGY STUDY: SIGNIFICANT CHANGE UP

## 2023-06-06 NOTE — OB RN DELIVERY SUMMARY - NS_RESUSCITEFFORT_OBGYN_ALL_OB
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 by Dr Zapien    Rectal bleeding exam under anesthesia ([facility]) 2/2018    S/P bronchoscopy 6/5/2018 - [facility] (Dr Zapien) no evidence of tracheobronchomalacia in trachea or bronchial tubes    
Bulb Rachael-Pharynx Suction Only

## 2023-06-14 ENCOUNTER — RESULT REVIEW (OUTPATIENT)
Age: 33
End: 2023-06-14

## 2023-11-08 ENCOUNTER — NON-APPOINTMENT (OUTPATIENT)
Age: 33
End: 2023-11-08

## 2024-02-02 ENCOUNTER — RESULT REVIEW (OUTPATIENT)
Age: 34
End: 2024-02-02

## 2024-04-08 ENCOUNTER — APPOINTMENT (OUTPATIENT)
Dept: ENDOCRINOLOGY | Facility: CLINIC | Age: 34
End: 2024-04-08
Payer: COMMERCIAL

## 2024-04-08 VITALS
WEIGHT: 179 LBS | HEART RATE: 84 BPM | DIASTOLIC BLOOD PRESSURE: 70 MMHG | SYSTOLIC BLOOD PRESSURE: 116 MMHG | BODY MASS INDEX: 29.82 KG/M2 | OXYGEN SATURATION: 98 % | HEIGHT: 65 IN

## 2024-04-08 DIAGNOSIS — E04.2 NONTOXIC MULTINODULAR GOITER: ICD-10-CM

## 2024-04-08 PROCEDURE — 99203 OFFICE O/P NEW LOW 30 MIN: CPT

## 2024-04-08 PROCEDURE — G2211 COMPLEX E/M VISIT ADD ON: CPT | Mod: NC,1L

## 2024-04-08 NOTE — HISTORY OF PRESENT ILLNESS
[FreeTextEntry1] : 32 yo F with PMH of carpal tunnel syndrome, GERD, allergic reactions (unknown triggers) here for MNG.  Has never seen Endo before. Was first told about thyroid issues with PCP and was told she had nodules. Had US which showing nodules in 2021, had FNA-  no cancer.  Reports normal thyroid function. No head/neck radiation. G[place] up in [place].  Sister with Graves, with eye disease.  Pt did not have any thyroid issues in pregnancy.  Had cholestasis in pregnancy. Some hoarse voice but has GERD  Gets Xolair injections q2 weeks - notes she gets randomly allergic reactions Takes zyrtec, pepcid, xyzal No biotin   2/2023 at [facility] [place] - on her phone nodules  rmp 2.4x1.1x2.4 cm , solid hypoechoic without calcification rmp  0.7x0.5x0.9 cm and 0.9x0.6x0.9 cm mixed cystic and isoechoic solid lmp 2.1x0.5x1 cm solid hypoechoic without calcification   Menstrual hx LMP last week Regular  Menarche  9 yo  No family hx of thyroid nodules or thyroid cancer sister with graves disease

## 2024-04-08 NOTE — PHYSICAL EXAM
[Alert] : alert [Well Nourished] : well nourished [No Acute Distress] : no acute distress [Well Developed] : well developed [Normal Sclera/Conjunctiva] : normal sclera/conjunctiva [EOMI] : extra ocular movement intact [No Proptosis] : no proptosis [Normal Oropharynx] : the oropharynx was normal [Supple] : the neck was supple [No Respiratory Distress] : no respiratory distress [No Accessory Muscle Use] : no accessory muscle use [Clear to Auscultation] : lungs were clear to auscultation bilaterally [Normal S1, S2] : normal S1 and S2 [Normal Rate] : heart rate was normal [Regular Rhythm] : with a regular rhythm [No Edema] : no peripheral edema [Pedal Pulses Normal] : the pedal pulses are present [Normal Bowel Sounds] : normal bowel sounds [Not Tender] : non-tender [Not Distended] : not distended [Soft] : abdomen soft [Normal Anterior Cervical Nodes] : no anterior cervical lymphadenopathy [Normal Posterior Cervical Nodes] : no posterior cervical lymphadenopathy [No Spinal Tenderness] : no spinal tenderness [Spine Straight] : spine straight [No Stigmata of Cushings Syndrome] : no stigmata of Cushings Syndrome [Normal Gait] : normal gait [Normal Strength/Tone] : muscle strength and tone were normal [No Rash] : no rash [Acanthosis Nigricans] : no acanthosis nigricans [Normal Reflexes] : deep tendon reflexes were 2+ and symmetric [No Tremors] : no tremors [Oriented x3] : oriented to person, place, and time [de-identified] : bilateral nodules L>R palpated

## 2024-04-08 NOTE — ASSESSMENT
[FreeTextEntry1] : 34 yo F with PMH of carpal tunnel syndrome, GERD, allergic reactions (unknown triggers) here for MNG.  #MNG - repeat thyroid US with radiology - s/p FNA benign 2021 - pt will obtain prior records - check TFTs  RTC 1 year

## 2024-04-10 LAB
T4 FREE SERPL-MCNC: 1.2 NG/DL
TSH SERPL-ACNC: 0.5 UIU/ML

## 2024-05-07 ENCOUNTER — NON-APPOINTMENT (OUTPATIENT)
Age: 34
End: 2024-05-07

## 2024-05-08 ENCOUNTER — EMERGENCY (EMERGENCY)
Facility: HOSPITAL | Age: 34
LOS: 1 days | Discharge: HOME | End: 2024-05-08
Attending: EMERGENCY MEDICINE | Admitting: EMERGENCY MEDICINE
Payer: COMMERCIAL

## 2024-05-08 VITALS
OXYGEN SATURATION: 98 % | RESPIRATION RATE: 16 BRPM | HEART RATE: 96 BPM | DIASTOLIC BLOOD PRESSURE: 76 MMHG | SYSTOLIC BLOOD PRESSURE: 114 MMHG | TEMPERATURE: 100 F

## 2024-05-08 VITALS
SYSTOLIC BLOOD PRESSURE: 103 MMHG | OXYGEN SATURATION: 98 % | TEMPERATURE: 98 F | HEART RATE: 92 BPM | RESPIRATION RATE: 18 BRPM | DIASTOLIC BLOOD PRESSURE: 66 MMHG

## 2024-05-08 LAB
ALBUMIN SERPL ELPH-MCNC: 4.9 G/DL — SIGNIFICANT CHANGE UP (ref 3.3–5)
ALP SERPL-CCNC: 108 U/L — SIGNIFICANT CHANGE UP (ref 40–120)
ALT FLD-CCNC: 9 U/L — SIGNIFICANT CHANGE UP (ref 4–33)
ANION GAP SERPL CALC-SCNC: 15 MMOL/L — HIGH (ref 7–14)
APTT BLD: 30 SEC — SIGNIFICANT CHANGE UP (ref 24.5–35.6)
AST SERPL-CCNC: 16 U/L — SIGNIFICANT CHANGE UP (ref 4–32)
BASOPHILS # BLD AUTO: 0.04 K/UL — SIGNIFICANT CHANGE UP (ref 0–0.2)
BASOPHILS NFR BLD AUTO: 0.3 % — SIGNIFICANT CHANGE UP (ref 0–2)
BILIRUB SERPL-MCNC: 0.9 MG/DL — SIGNIFICANT CHANGE UP (ref 0.2–1.2)
BLD GP AB SCN SERPL QL: NEGATIVE — SIGNIFICANT CHANGE UP
BUN SERPL-MCNC: 7 MG/DL — SIGNIFICANT CHANGE UP (ref 7–23)
CALCIUM SERPL-MCNC: 9.3 MG/DL — SIGNIFICANT CHANGE UP (ref 8.4–10.5)
CHLORIDE SERPL-SCNC: 100 MMOL/L — SIGNIFICANT CHANGE UP (ref 98–107)
CO2 SERPL-SCNC: 22 MMOL/L — SIGNIFICANT CHANGE UP (ref 22–31)
CREAT SERPL-MCNC: 0.6 MG/DL — SIGNIFICANT CHANGE UP (ref 0.5–1.3)
EGFR: 119 ML/MIN/1.73M2 — SIGNIFICANT CHANGE UP
EOSINOPHIL # BLD AUTO: 0.18 K/UL — SIGNIFICANT CHANGE UP (ref 0–0.5)
EOSINOPHIL NFR BLD AUTO: 1.3 % — SIGNIFICANT CHANGE UP (ref 0–6)
GLUCOSE SERPL-MCNC: 91 MG/DL — SIGNIFICANT CHANGE UP (ref 70–99)
HCG SERPL-ACNC: <1 MIU/ML — SIGNIFICANT CHANGE UP
HCT VFR BLD CALC: 40.4 % — SIGNIFICANT CHANGE UP (ref 34.5–45)
HGB BLD-MCNC: 12.3 G/DL — SIGNIFICANT CHANGE UP (ref 11.5–15.5)
IANC: 11.55 K/UL — HIGH (ref 1.8–7.4)
IMM GRANULOCYTES NFR BLD AUTO: 0.3 % — SIGNIFICANT CHANGE UP (ref 0–0.9)
INR BLD: 1.17 RATIO — SIGNIFICANT CHANGE UP (ref 0.85–1.18)
LYMPHOCYTES # BLD AUTO: 1.5 K/UL — SIGNIFICANT CHANGE UP (ref 1–3.3)
LYMPHOCYTES # BLD AUTO: 10.6 % — LOW (ref 13–44)
MCHC RBC-ENTMCNC: 22 PG — LOW (ref 27–34)
MCHC RBC-ENTMCNC: 30.4 GM/DL — LOW (ref 32–36)
MCV RBC AUTO: 72.1 FL — LOW (ref 80–100)
MONOCYTES # BLD AUTO: 0.85 K/UL — SIGNIFICANT CHANGE UP (ref 0–0.9)
MONOCYTES NFR BLD AUTO: 6 % — SIGNIFICANT CHANGE UP (ref 2–14)
NEUTROPHILS # BLD AUTO: 11.55 K/UL — HIGH (ref 1.8–7.4)
NEUTROPHILS NFR BLD AUTO: 81.5 % — HIGH (ref 43–77)
NRBC # BLD: 0 /100 WBCS — SIGNIFICANT CHANGE UP (ref 0–0)
NRBC # FLD: 0 K/UL — SIGNIFICANT CHANGE UP (ref 0–0)
PLATELET # BLD AUTO: 292 K/UL — SIGNIFICANT CHANGE UP (ref 150–400)
POTASSIUM SERPL-MCNC: 4 MMOL/L — SIGNIFICANT CHANGE UP (ref 3.5–5.3)
POTASSIUM SERPL-SCNC: 4 MMOL/L — SIGNIFICANT CHANGE UP (ref 3.5–5.3)
PROT SERPL-MCNC: 8.3 G/DL — SIGNIFICANT CHANGE UP (ref 6–8.3)
PROTHROM AB SERPL-ACNC: 13.2 SEC — HIGH (ref 9.5–13)
RBC # BLD: 5.6 M/UL — HIGH (ref 3.8–5.2)
RBC # FLD: 14.1 % — SIGNIFICANT CHANGE UP (ref 10.3–14.5)
RH IG SCN BLD-IMP: POSITIVE — SIGNIFICANT CHANGE UP
SODIUM SERPL-SCNC: 137 MMOL/L — SIGNIFICANT CHANGE UP (ref 135–145)
WBC # BLD: 14.16 K/UL — HIGH (ref 3.8–10.5)
WBC # FLD AUTO: 14.16 K/UL — HIGH (ref 3.8–10.5)

## 2024-05-08 PROCEDURE — 99285 EMERGENCY DEPT VISIT HI MDM: CPT

## 2024-05-08 RX ORDER — SODIUM CHLORIDE 9 MG/ML
1000 INJECTION INTRAMUSCULAR; INTRAVENOUS; SUBCUTANEOUS ONCE
Refills: 0 | Status: COMPLETED | OUTPATIENT
Start: 2024-05-08 | End: 2024-05-08

## 2024-05-08 RX ORDER — ONDANSETRON 8 MG/1
4 TABLET, FILM COATED ORAL ONCE
Refills: 0 | Status: COMPLETED | OUTPATIENT
Start: 2024-05-08 | End: 2024-05-08

## 2024-05-08 RX ORDER — KETOROLAC TROMETHAMINE 30 MG/ML
15 SYRINGE (ML) INJECTION ONCE
Refills: 0 | Status: DISCONTINUED | OUTPATIENT
Start: 2024-05-08 | End: 2024-05-08

## 2024-05-08 RX ORDER — DEXAMETHASONE 0.5 MG/5ML
6 ELIXIR ORAL ONCE
Refills: 0 | Status: COMPLETED | OUTPATIENT
Start: 2024-05-08 | End: 2024-05-08

## 2024-05-08 RX ADMIN — Medication 100 MILLIGRAM(S): at 21:24

## 2024-05-08 RX ADMIN — Medication 15 MILLIGRAM(S): at 21:21

## 2024-05-08 RX ADMIN — SODIUM CHLORIDE 1000 MILLILITER(S): 9 INJECTION INTRAMUSCULAR; INTRAVENOUS; SUBCUTANEOUS at 21:23

## 2024-05-08 RX ADMIN — ONDANSETRON 4 MILLIGRAM(S): 8 TABLET, FILM COATED ORAL at 23:36

## 2024-05-08 RX ADMIN — Medication 6 MILLIGRAM(S): at 21:24

## 2024-05-08 NOTE — ED ADULT TRIAGE NOTE - CHIEF COMPLAINT QUOTE
pt sent from  for peritonsillar abscess, pt c/o of 3 days sore throat worsening pain, drooling/ spitting, pt breathing even and unlabored, past med xh gerd, seasonal allergies

## 2024-05-08 NOTE — ED ADULT NURSE NOTE - OBJECTIVE STATEMENT
Ptsent from urgent care for peritonsillar abscess. Pt c/o worsening sore throat x 3 days. Pt alsospeech sounds garbled, airway clear, respirations unlabored. Pt denies SOB. Denies fever/chills. 20g placed in L AC, labs drawn as ordered. Pt awaiting ct scan.

## 2024-05-09 PROCEDURE — 70491 CT SOFT TISSUE NECK W/DYE: CPT | Mod: 26,MC

## 2024-05-09 RX ORDER — DEXAMETHASONE 0.5 MG/5ML
6 ELIXIR ORAL ONCE
Refills: 0 | Status: COMPLETED | OUTPATIENT
Start: 2024-05-09 | End: 2024-05-09

## 2024-05-09 RX ORDER — LIDOCAINE HYDROCHLORIDE AND EPINEPHRINE 10; 10 MG/ML; UG/ML
10 INJECTION, SOLUTION INFILTRATION; PERINEURAL ONCE
Refills: 0 | Status: COMPLETED | OUTPATIENT
Start: 2024-05-09 | End: 2024-05-09

## 2024-05-09 RX ORDER — IBUPROFEN 200 MG
1 TABLET ORAL
Qty: 20 | Refills: 0
Start: 2024-05-09 | End: 2024-05-13

## 2024-05-09 RX ORDER — IBUPROFEN 200 MG
30 TABLET ORAL
Qty: 630 | Refills: 0
Start: 2024-05-09 | End: 2024-05-15

## 2024-05-09 RX ORDER — BENZOCAINE 10 %
1 GEL (GRAM) MUCOUS MEMBRANE ONCE
Refills: 0 | Status: COMPLETED | OUTPATIENT
Start: 2024-05-09 | End: 2024-05-09

## 2024-05-09 RX ADMIN — Medication 6 MILLIGRAM(S): at 01:25

## 2024-05-09 RX ADMIN — Medication 100 MILLIGRAM(S): at 03:17

## 2024-05-09 NOTE — ED ADULT NURSE REASSESSMENT NOTE - NS ED NURSE REASSESS COMMENT FT1
Upon reassessment pt respirations even and unlabored on room air. No acute distress noted. Pt speaking in full sentences, no drooling or stridor noted. Denies headache, dizziness, chest pain and SOB. Pt mediated per EMAR. Pt transported to intake room 9 via stretcher by ED Jolynn del toro.

## 2024-05-09 NOTE — ED PROVIDER NOTE - CLINICAL SUMMARY MEDICAL DECISION MAKING FREE TEXT BOX
The patient is a 34y Female who has a past medical and surgery history of Mast cell activation syndrome Thyroid nodule GERD PTED from pt sent from  for evaluation of peritonsillar abscess after presenting there c/o of 3 days sore throat worsening odynphagia Patient is not having problems swallowing secretions and no stridor is noted on exam no change in voice some trismus   Vital Signs Stable; 98%  PE: as described; my additions and exceptions are noted in the chart    DATA:  EKG: pending at time of evaluation  LAB: see pullset    CT Neck w/ contrast IMPRESSION:  1.  Enlarging left tonsillar abscess.  2.  Right thyroid nodule, recommend ultrasound.    IMPRESSION/RISK:  Dx= intratonsillar abscess   Consideration include antibiotics dex given with improvement of s/s   Plan

## 2024-05-09 NOTE — ED PROVIDER NOTE - OBJECTIVE STATEMENT
The patient is a 34y Female who has a past medical and surgery history of Mast cell activation syndrome Thyroid nodule GERD PTED from pt sent from  for evaluation of peritonsillar abscess after presenting there c/o of 3 days sore throat worsening odynphagia Patient is not having problems swallowing secretions and no stridor is noted on exam no change in voice some trismus

## 2024-05-09 NOTE — ED PROVIDER NOTE - PATIENT PORTAL LINK FT
You can access the FollowMyHealth Patient Portal offered by [facility] by registering at the following website: [email or web address]. By joining [employer]’s FollowMyHealth portal, you will also be able to view your health information using other applications (apps) compatible with our system.

## 2024-05-09 NOTE — ED PROVIDER NOTE - ENMT, MLM
Airway patent, mild trismus Nasal mucosa clear. Mouth with normal mucosa. Throat has no vesicles, no oropharyngeal exudates and uvula is midline.  Lt PTA

## 2024-05-09 NOTE — ED PROVIDER NOTE - NSFOLLOWUPINSTRUCTIONS_ED_ALL_ED_FT
[facility] - ENT  Otolaryngology (ENT)  [address] [place] [address]  Phone: [phone]    Elliot Mancini  OTOLARYNGOLOGY  [address], [address]  [place] [address]  Phone: [phone]  Fax: [phone]    Peritonsillar Abscess  WHAT YOU NEED TO KNOW:  A peritonsillar abscess, or PTA, is a collection of pus in the peritonsillar space. The peritonsillar space is the area between your tonsil and the back wall of your throat. It is near the opening of the tubes leading to your stomach and lungs.   DISCHARGE INSTRUCTIONS:  Call 911 if:   •You have trouble breathing.  Seek care immediately if:   •You have more pain, swelling, or redness in your throat.  •Your symptoms get worse or do not get better, even with treatment.  •You have difficulty or pain when you swallow, or you cannot eat or drink.  Contact your healthcare provider if:   •Your abscess returns.  •You have questions or concerns about your condition or care.  Medicines:   •Antibiotics help treat or prevent a bacterial infection.   •Acetaminophen decreases pain and fever. It is available without a doctor's order. Ask how much to take and how often to take it. Follow directions. Acetaminophen can cause liver damage if not taken correctly.  •Steroids decrease swelling.   •NSAIDs, such as ibuprofen, help decrease swelling, pain, and fever. This medicine is available with or without a doctor's order. NSAIDs can cause stomach bleeding or kidney problems in certain people. If you take blood thinner medicine, always ask if NSAIDs are safe for you. Always read the medicine label and follow directions. Do not give these medicines to children under 6 months of age without direction from your child's healthcare provider.  •Take your medicine as directed. Contact your healthcare provider if you think your medicine is not helping or if you have side effects. Tell him or her if you are allergic to any medicine. Keep a list of the medicines, vitamins, and herbs you take. Include the amounts, and when and why you take them. Bring the list or the pill bottles to follow-up visits. Carry your medicine list with you in case of an emergency.  Manage your symptoms:   •A liquid diet may decrease your discomfort until the PTA is healed. A liquid diet may include jello, juices, or ice pops.   Follow up with your healthcare provider as directed: Write down your questions so you can remember to ask them during your visits. You were seen in the emergency department for TONSILLITIS and a very small, developing INTRATONSILLAR ABSCESS.  You received IV steroids, pain medications, and antibiotics.  You were also seen by the ENT doctor.  It is recommended that you take CLINDAMYCIN 300MG EVERY 6 HOURS for 10 DAYS.  This medication is present in breast-milk and it is recommended that you limit or do no breast feed while taking this medication.    Drink plenty of fluids.  You can take ibuprofen 600mg every 6 hours or Tylenol 650mg every 4 hours as needed for pain or fever.  Follow-up with the ENT doctor (see info below).  Return to the emergency department if no improvement in 2-3 days, have worsening ability to swallow, difficulty breathing, and/or difficulty opening your mouth.      [facility] - ENT  Otolaryngology (ENT)  [address] [place] [address]  Phone: [phone]    Elliot Mancini  OTOLARYNGOLOGY  [address] [place] [address], [address]  [place] [address]  Phone: [phone]  Fax: [phone]    Peritonsillar Abscess  WHAT YOU NEED TO KNOW:  A peritonsillar abscess, or PTA, is a collection of pus in the peritonsillar space. The peritonsillar space is the area between your tonsil and the back wall of your throat. It is near the opening of the tubes leading to your stomach and lungs.   DISCHARGE INSTRUCTIONS:  Call 911 if:   •You have trouble breathing.  Seek care immediately if:   •You have more pain, swelling, or redness in your throat.  •Your symptoms get worse or do not get better, even with treatment.  •You have difficulty or pain when you swallow, or you cannot eat or drink.  Contact your healthcare provider if:   •Your abscess returns.  •You have questions or concerns about your condition or care.  Medicines:   •Antibiotics help treat or prevent a bacterial infection.   •Acetaminophen decreases pain and fever. It is available without a doctor's order. Ask how much to take and how often to take it. Follow directions. Acetaminophen can cause liver damage if not taken correctly.  •Steroids decrease swelling.   •NSAIDs, such as ibuprofen, help decrease swelling, pain, and fever. This medicine is available with or without a doctor's order. NSAIDs can cause stomach bleeding or kidney problems in certain people. If you take blood thinner medicine, always ask if NSAIDs are safe for you. Always read the medicine label and follow directions. Do not give these medicines to children under 6 months of age without direction from your child's healthcare provider.  •Take your medicine as directed. Contact your healthcare provider if you think your medicine is not helping or if you have side effects. Tell him or her if you are allergic to any medicine. Keep a list of the medicines, vitamins, and herbs you take. Include the amounts, and when and why you take them. Bring the list or the pill bottles to follow-up visits. Carry your medicine list with you in case of an emergency.  Manage your symptoms:   •A liquid diet may decrease your discomfort until the PTA is healed. A liquid diet may include jello, juices, or ice pops.   Follow up with your healthcare provider as directed: Write down your questions so you can remember to ask them during your visits.

## 2024-05-09 NOTE — ED PROVIDER NOTE - PROGRESS NOTE DETAILS
Ori ODELL: Pt signed out to me.  She reports feeling better, pain resolved.  She has no voice changes, no drooling or stridor, no trismus.  Pt has been evaluated by ENT - there is no drainable abscess after PE and review of CT.  They recommend continuing abx.  Pt rec'd 2nd dose of clinda here (pt has PCN allergy), will cont oral clinda upon discharge.  Pt is tolerating PO here, will dc home with strict return precautions.

## 2024-05-09 NOTE — CONSULT NOTE ADULT - SUBJECTIVE AND OBJECTIVE BOX
HPI: 34y Female with pmh mast cell activation syndrome (gets recurrent swelling attacks, mostly peripherally but has happened once in her throat, on xolair q2 weeks) who p/w 2 days of sore throat. Abx naive. No fevers or chills. Felt like it was difficult to speak or swallow. Received clinda x1 and decadron x2. Vocalizing well, tolerating secretions.     Allergies    penicillin (Unknown)    Intolerances        PAST MEDICAL & SURGICAL HISTORY:  Mast cell activation syndrome      Thyroid nodule  benign      No significant past surgical history          SOCIAL HISTORY:  Tobacco History:  ETOH Use:   Drug Use:     FAMILY HISTORY:  No pertinent family history in first degree relatives        REVIEW OF SYSTEMS    General:	  As per HPI      MEDICATIONS:        Vital Signs Last 24 Hrs  T(C): 36.9 (08 May 2024 23:50), Max: 37.6 (08 May 2024 18:37)  T(F): 98.5 (08 May 2024 23:50), Max: 99.6 (08 May 2024 18:37)  HR: 92 (08 May 2024 23:50) (92 - 96)  BP: 103/66 (08 May 2024 23:50) (103/66 - 114/76)  BP(mean): 79 (08 May 2024 23:50) (79 - 79)  RR: 18 (08 May 2024 23:50) (16 - 18)  SpO2: 98% (08 May 2024 23:50) (98% - 98%)    Parameters below as of 08 May 2024 23:50  Patient On (Oxygen Delivery Method): room air        LABS:  CBC-    05-08    137  |  100  |  7   ----------------------------<  91  4.0   |  22  |  0.60    Ca    9.3      08 May 2024 21:06    TPro  8.3  /  Alb  4.9  /  TBili  0.9  /  DBili  x   /  AST  16  /  ALT  9   /  AlkPhos  108  05-08    Coagulation Studies-  PT/INR - ( 08 May 2024 21:06 )   PT: 13.2 sec;   INR: 1.17 ratio         PTT - ( 08 May 2024 21:06 )  PTT:30.0 sec  Endocrine Panel-  --  --  9.3 mg/dL        PHYSICAL EXAM:    ENT EXAM-   Constitutional: Well-developed, well-nourished.   Voice: No hoarseness, no muffled voice   Head:  normocephalic, atraumatic.   Ears:  External ears normal  Nose:  Septum intact, midline, deviated.  Inferior turbinates normal bilateral  OC/OP: Tongue midline, palate symmetric  Mucosa moist. no trismus   Neck:  Trachea midline.  mild ttp of left neck    MULTISYSTEM EXAM-  Neuro/Psych:  Awake, alert, cooperative  Cranial nerves: 2-12 grossly intact bilaterally.  Eyes:  EOMI, no nystagmus.  Pulm:  No dyspnea, non-labored breathing, no stridor or stertor  Cardiovascular: Extremities warm and well perfused  Skin:  No rash or lesions on exposed skin of head/neck        RADIOLOGY & ADDITIONAL STUDIES:  CT neck read pending, on my review approx 1cm intratonsillar abscess on the left.     Assessment/Plan:  34y Female with tonsillitis and likely intratonsillar abscess. On exam she is comfortable, tolerating secretions and has no persistent trismus, voice change or palate asymmetry to suggest a drainable PTA. Would manage conservatively at this time if tolerating PO. Discussed return precautions.   -  2nd dose of clinda   -  PO trial   -  home with 10d clinda, take with probiotic   -  return if no improvement in 2-3 days, worsening ability to swallow, difficulty breathing, opening mouth         HPI: 34y Female with pmh mast cell activation syndrome (gets recurrent swelling attacks, mostly peripherally but has happened once in her throat, on xolair q2 weeks) who p/w 2 days of sore throat. Abx naive. No fevers or chills. Felt like it was difficult to speak or swallow. Received clinda x1 and decadron x2. Vocalizing well, tolerating secretions.     Allergies    penicillin (Unknown)    Intolerances        PAST MEDICAL & SURGICAL HISTORY:  Mast cell activation syndrome      Thyroid nodule  benign      No significant past surgical history          SOCIAL HISTORY:  Tobacco History:  ETOH Use:   Drug Use:     FAMILY HISTORY:  No pertinent family history in first degree relatives        REVIEW OF SYSTEMS    General:	  As per HPI      MEDICATIONS:        Vital Signs Last 24 Hrs  T(C): 36.9 (08 May 2024 23:50), Max: 37.6 (08 May 2024 18:37)  T(F): 98.5 (08 May 2024 23:50), Max: 99.6 (08 May 2024 18:37)  HR: 92 (08 May 2024 23:50) (92 - 96)  BP: 103/66 (08 May 2024 23:50) (103/66 - 114/76)  BP(mean): 79 (08 May 2024 23:50) (79 - 79)  RR: 18 (08 May 2024 23:50) (16 - 18)  SpO2: 98% (08 May 2024 23:50) (98% - 98%)    Parameters below as of 08 May 2024 23:50  Patient On (Oxygen Delivery Method): room air        LABS:  CBC-    05-08    137  |  100  |  7   ----------------------------<  91  4.0   |  22  |  0.60    Ca    9.3      08 May 2024 21:06    TPro  8.3  /  Alb  4.9  /  TBili  0.9  /  DBili  x   /  AST  16  /  ALT  9   /  AlkPhos  108  05-08    Coagulation Studies-  PT/INR - ( 08 May 2024 21:06 )   PT: 13.2 sec;   INR: 1.17 ratio         PTT - ( 08 May 2024 21:06 )  PTT:30.0 sec  Endocrine Panel-  --  --  9.3 mg/dL        PHYSICAL EXAM:    ENT EXAM-   Constitutional: Well-developed, well-nourished.   Voice: No hoarseness, no muffled voice   Head:  normocephalic, atraumatic.   Ears:  External ears normal  Nose:  Septum intact, midline, deviated.  Inferior turbinates normal bilateral  OC/OP: Tongue midline, palate symmetric  Mucosa moist. no trismus   Neck:  Trachea midline.  mild ttp of left neck    MULTISYSTEM EXAM-  Neuro/Psych:  Awake, alert, cooperative  Cranial nerves: 2-12 grossly intact bilaterally.  Eyes:  EOMI, no nystagmus.  Pulm:  No dyspnea, non-labored breathing, no stridor or stertor  Cardiovascular: Extremities warm and well perfused  Skin:  No rash or lesions on exposed skin of head/neck        RADIOLOGY & ADDITIONAL STUDIES:  < from: CT Neck Soft Tissue w/ IV Cont (05.09.24 @ 01:03) >    INTERPRETATION:  CT NECK WITH CONTRAST    INDICATION: Concern for abscess    TECHNIQUE: Multiple contiguous axial CT images wereobtained of the neck   following the intravenous administration of 90 cc Omnipaque 350 iodinated   contrast; 10 cc discarded. Coronal and sagittal reformatted images were   obtained.    COMPARISON: None    FINDINGS:  NASOPHARYNX / SKULL BASE: No massis noted.    SUPRAHYOID NECK: There is a peripherally enhancing 1.8 x 2.5 x 2.5 cm   edematous collection suggested within the region of the left   palatine/adenoid tonsils. Edematous submucosal changes extend inferiorly,   causing asymmetric effacement of the left piriform recess. Otherwise, the   oral cavity is normal. The parotid glands are unremarkable. The   submandibular glands are unremarkable.    INFRAHYOID NECK: The hypopharynx demonstrates asymmetric narrowing with   effacement of the left piriform recess. Secretions present in the   bilateral vallecula. The larynx and proximal subglottic airway are   unremarkable.    THYROID GLAND: The thyroid is heterogeneous in appearance without   questionable appearance of hypoattenuating nodules in the bilateral lower   lobes.    LYMPH NODES: There is no evidence of cervical lymphadenopathy.    LUNG APICES / VASCULAR STRUCTURES: The imaged portions of the lung apices   are unremarkable. Vascular structures in the neck are normal.    PARANASAL SINUSES: The paranasal sinuses are clear.    IMAGED SPINE: Imaged spine is normal.    ADDITIONAL OBSERVATIONS: The bones are unremarkable. No intracranial   pathology is evident.      IMPRESSION:  Phlegmonous peripherally enhancing 2.5 cm collection identified in the   region of the left palatine/adenoid tonsils, favored to represent   peritonsillar abscess. Edematous submucosal changes extend inferiorly to   efface the left piriform recess, although the visualized airway remains   widely patent.    --- End of Report ---            BONNIE FINK MD; Attending Radiologist  This document has been electronically signed. May  9 2024  3:27AM    < end of copied text >        Assessment/Plan:  34y Female with tonsillitis and likely intratonsillar abscess. On exam she is comfortable, tolerating secretions and has no persistent trismus, voice change or palate asymmetry to suggest a drainable PTA. On review of imaging she has a small collection predominantly intratonsillar with surrounding phlegmon. Would manage conservatively at this time if tolerating PO. Discussed return precautions.   -  2nd dose of clinda   -  PO trial   -  home with 10d clinda, take with probiotic   -  return if no improvement in 2-3 days, worsening ability to swallow, difficulty breathing, opening mouth

## 2024-05-11 ENCOUNTER — EMERGENCY (EMERGENCY)
Facility: HOSPITAL | Age: 34
LOS: 1 days | Discharge: HOME | End: 2024-05-11
Attending: STUDENT IN AN ORGANIZED HEALTH CARE EDUCATION/TRAINING PROGRAM | Admitting: STUDENT IN AN ORGANIZED HEALTH CARE EDUCATION/TRAINING PROGRAM
Payer: COMMERCIAL

## 2024-05-11 VITALS
SYSTOLIC BLOOD PRESSURE: 116 MMHG | TEMPERATURE: 98 F | RESPIRATION RATE: 16 BRPM | HEART RATE: 94 BPM | OXYGEN SATURATION: 98 % | DIASTOLIC BLOOD PRESSURE: 63 MMHG

## 2024-05-11 VITALS
OXYGEN SATURATION: 98 % | DIASTOLIC BLOOD PRESSURE: 75 MMHG | SYSTOLIC BLOOD PRESSURE: 142 MMHG | RESPIRATION RATE: 18 BRPM | HEART RATE: 75 BPM | TEMPERATURE: 98 F

## 2024-05-11 LAB
ALBUMIN SERPL ELPH-MCNC: 3.9 G/DL — SIGNIFICANT CHANGE UP (ref 3.3–5)
ALP SERPL-CCNC: 77 U/L — SIGNIFICANT CHANGE UP (ref 40–120)
ALT FLD-CCNC: 7 U/L — SIGNIFICANT CHANGE UP (ref 4–33)
ANION GAP SERPL CALC-SCNC: 9 MMOL/L — SIGNIFICANT CHANGE UP (ref 7–14)
APTT BLD: 30.5 SEC — SIGNIFICANT CHANGE UP (ref 24.5–35.6)
AST SERPL-CCNC: 12 U/L — SIGNIFICANT CHANGE UP (ref 4–32)
BASOPHILS # BLD AUTO: 0.03 K/UL — SIGNIFICANT CHANGE UP (ref 0–0.2)
BASOPHILS NFR BLD AUTO: 0.4 % — SIGNIFICANT CHANGE UP (ref 0–2)
BILIRUB SERPL-MCNC: 0.9 MG/DL — SIGNIFICANT CHANGE UP (ref 0.2–1.2)
BUN SERPL-MCNC: 7 MG/DL — SIGNIFICANT CHANGE UP (ref 7–23)
CALCIUM SERPL-MCNC: 8.7 MG/DL — SIGNIFICANT CHANGE UP (ref 8.4–10.5)
CHLORIDE SERPL-SCNC: 104 MMOL/L — SIGNIFICANT CHANGE UP (ref 98–107)
CO2 SERPL-SCNC: 24 MMOL/L — SIGNIFICANT CHANGE UP (ref 22–31)
CREAT SERPL-MCNC: 0.64 MG/DL — SIGNIFICANT CHANGE UP (ref 0.5–1.3)
EGFR: 117 ML/MIN/1.73M2 — SIGNIFICANT CHANGE UP
EOSINOPHIL # BLD AUTO: 0.05 K/UL — SIGNIFICANT CHANGE UP (ref 0–0.5)
EOSINOPHIL NFR BLD AUTO: 0.6 % — SIGNIFICANT CHANGE UP (ref 0–6)
GLUCOSE SERPL-MCNC: 85 MG/DL — SIGNIFICANT CHANGE UP (ref 70–99)
HCT VFR BLD CALC: 34.3 % — LOW (ref 34.5–45)
HGB BLD-MCNC: 10.5 G/DL — LOW (ref 11.5–15.5)
IANC: 5.99 K/UL — SIGNIFICANT CHANGE UP (ref 1.8–7.4)
IMM GRANULOCYTES NFR BLD AUTO: 0.3 % — SIGNIFICANT CHANGE UP (ref 0–0.9)
INR BLD: 1.15 RATIO — SIGNIFICANT CHANGE UP (ref 0.85–1.18)
LYMPHOCYTES # BLD AUTO: 1.14 K/UL — SIGNIFICANT CHANGE UP (ref 1–3.3)
LYMPHOCYTES # BLD AUTO: 14.4 % — SIGNIFICANT CHANGE UP (ref 13–44)
MCHC RBC-ENTMCNC: 21.8 PG — LOW (ref 27–34)
MCHC RBC-ENTMCNC: 30.6 GM/DL — LOW (ref 32–36)
MCV RBC AUTO: 71.3 FL — LOW (ref 80–100)
MONOCYTES # BLD AUTO: 0.71 K/UL — SIGNIFICANT CHANGE UP (ref 0–0.9)
MONOCYTES NFR BLD AUTO: 8.9 % — SIGNIFICANT CHANGE UP (ref 2–14)
NEUTROPHILS # BLD AUTO: 5.99 K/UL — SIGNIFICANT CHANGE UP (ref 1.8–7.4)
NEUTROPHILS NFR BLD AUTO: 75.4 % — SIGNIFICANT CHANGE UP (ref 43–77)
NRBC # BLD: 0 /100 WBCS — SIGNIFICANT CHANGE UP (ref 0–0)
NRBC # FLD: 0 K/UL — SIGNIFICANT CHANGE UP (ref 0–0)
PLATELET # BLD AUTO: 232 K/UL — SIGNIFICANT CHANGE UP (ref 150–400)
POTASSIUM SERPL-MCNC: 3.8 MMOL/L — SIGNIFICANT CHANGE UP (ref 3.5–5.3)
POTASSIUM SERPL-SCNC: 3.8 MMOL/L — SIGNIFICANT CHANGE UP (ref 3.5–5.3)
PROT SERPL-MCNC: 6.8 G/DL — SIGNIFICANT CHANGE UP (ref 6–8.3)
PROTHROM AB SERPL-ACNC: 12.8 SEC — SIGNIFICANT CHANGE UP (ref 9.5–13)
RBC # BLD: 4.81 M/UL — SIGNIFICANT CHANGE UP (ref 3.8–5.2)
RBC # FLD: 14 % — SIGNIFICANT CHANGE UP (ref 10.3–14.5)
SODIUM SERPL-SCNC: 137 MMOL/L — SIGNIFICANT CHANGE UP (ref 135–145)
WBC # BLD: 7.94 K/UL — SIGNIFICANT CHANGE UP (ref 3.8–10.5)
WBC # FLD AUTO: 7.94 K/UL — SIGNIFICANT CHANGE UP (ref 3.8–10.5)

## 2024-05-11 PROCEDURE — 70491 CT SOFT TISSUE NECK W/DYE: CPT | Mod: 26,MC

## 2024-05-11 PROCEDURE — 99285 EMERGENCY DEPT VISIT HI MDM: CPT

## 2024-05-11 RX ORDER — AMPICILLIN SODIUM AND SULBACTAM SODIUM 250; 125 MG/ML; MG/ML
3 INJECTION, POWDER, FOR SUSPENSION INTRAMUSCULAR; INTRAVENOUS ONCE
Refills: 0 | Status: COMPLETED | OUTPATIENT
Start: 2024-05-11 | End: 2024-05-11

## 2024-05-11 RX ORDER — AMPICILLIN SODIUM AND SULBACTAM SODIUM 250; 125 MG/ML; MG/ML
INJECTION, POWDER, FOR SUSPENSION INTRAMUSCULAR; INTRAVENOUS
Refills: 0 | Status: DISCONTINUED | OUTPATIENT
Start: 2024-05-11 | End: 2024-05-14

## 2024-05-11 RX ORDER — DEXAMETHASONE 0.5 MG/5ML
10 ELIXIR ORAL ONCE
Refills: 0 | Status: COMPLETED | OUTPATIENT
Start: 2024-05-11 | End: 2024-05-11

## 2024-05-11 RX ORDER — KETOROLAC TROMETHAMINE 30 MG/ML
15 SYRINGE (ML) INJECTION ONCE
Refills: 0 | Status: DISCONTINUED | OUTPATIENT
Start: 2024-05-11 | End: 2024-05-11

## 2024-05-11 RX ORDER — AMPICILLIN SODIUM AND SULBACTAM SODIUM 250; 125 MG/ML; MG/ML
3 INJECTION, POWDER, FOR SUSPENSION INTRAMUSCULAR; INTRAVENOUS EVERY 6 HOURS
Refills: 0 | Status: DISCONTINUED | OUTPATIENT
Start: 2024-05-11 | End: 2024-05-14

## 2024-05-11 RX ADMIN — Medication 102 MILLIGRAM(S): at 14:24

## 2024-05-11 RX ADMIN — Medication 15 MILLIGRAM(S): at 14:25

## 2024-05-11 RX ADMIN — AMPICILLIN SODIUM AND SULBACTAM SODIUM 200 GRAM(S): 250; 125 INJECTION, POWDER, FOR SUSPENSION INTRAMUSCULAR; INTRAVENOUS at 16:41

## 2024-05-11 NOTE — ED ADULT NURSE REASSESSMENT NOTE - NS ED NURSE REASSESS COMMENT FT1
Patient has allergy to penicillin documented in chart. Explained to patient signs/symptoms of allergic reaction, acknowledged by patient, and Zosyn IV given. She was able to tolerate Zosyn with no adverse effect.

## 2024-05-11 NOTE — ED ADULT TRIAGE NOTE - CHIEF COMPLAINT QUOTE
c/o throat pain x 6 days. Seen here at [facility] ED 2 days ago and diagnosed with tonsilitis. Prescribed clindamycin, but does not feel any improvement. Pt states, " feeling at times, "i'm choking on something". Breathing unlabored, pt's speech garbled and soft. Phx thyroid nodule, mast cell activation syndrome

## 2024-05-11 NOTE — PROGRESS NOTE ADULT - SUBJECTIVE AND OBJECTIVE BOX
ENT Progress Note     Imaging reviewed. Left tonsillar abscess noted without peritonsillar extent. Pt tolerating unasyn. Would switch to augmentin or offer brief observation on IV unasyn in CDU.   - PO trial   - if discharging, augmentin 875-125mg BID x 10 days   - return precautions: drooling, high fevers, difficulty breathing, unable to tolerate PO   - ENT f/u PRN     Daiana Diamond MD   Otorhinolaryngology Head & Neck Surgery PGY3    [phone]# Pediatric ENT pager  [phone]# Adult ENT pager    Available on Teams, please page if urgent.

## 2024-05-11 NOTE — ED PROVIDER NOTE - PROGRESS NOTE DETAILS
Tai Centeno PGY2:  Spoke with ENT who will come and see the pt regarding PTA. Braden PGY3 KAYDEN  Patient is a 34 year-old-female with history of mast cell activation syndrome presents with worsening throat pain. Reports that she was seen here a few days ago and diagnosed with PTA and discharged home with clindamycin but has not improved her symptoms. Denies fever, chill, vomiting, coughing, chest pain, shortness of breath. Noted voice change. Patient does not appear to be in acute distress; able to protect airway at this time though voice sounded muffled. Will obtain labs and ENT consult. Will defer imaging at this time. Tai Centeno PGY2:  Pt was evaluated by ENT. They discussed with Pt about repeating CT vs attempting to drain. Decided to repeat CT neck. They would like to give Unasyn over Clindamycin and she does not know if she has a true penicillin allergy. Will give decadron and toradol Braden PGY3   Clarified with patient and patient reports that she never had reaction to penicillin but was told in 7/22 that she had allergies. But she denies any previous allergic reaction to penicillin. LIVIA Polanco PGY1- patient tolerated dose of unasyn here. spoke with ENT, ok to dc home with course of augmentin. explained results and strict return precautions to patient who verbalized understanding

## 2024-05-11 NOTE — ED PROVIDER NOTE - NSFOLLOWUPINSTRUCTIONS_ED_ALL_ED_FT
We sent an antibiotic (Augmentin) to your pharmacy. Please take this twice a day for 10 days.    Follow up with your primary care provider within the next 1-2 weeks.    SEEK IMMEDIATE MEDICAL CARE IF YOU OR YOUR CHILD HAVE ANY OF THE FOLLOWING SYMPTOMS : shortness of breath, rash/stiff neck/headache, severe abdominal pain, persistent vomiting, any signs of dehydration, or inability to eat or drink.

## 2024-05-11 NOTE — ED ADULT NURSE NOTE - NSFALLUNIVINTERV_ED_ALL_ED
Bed/Stretcher in lowest position, wheels locked, appropriate side rails in place/Call bell, personal items and telephone in reach/Instruct patient to call for assistance before getting out of bed/chair/stretcher/Non-slip footwear applied when patient is off stretcher/[place] to call system/Physically safe environment - no spills, clutter or unnecessary equipment/Purposeful proactive rounding/Room/bathroom lighting operational, light cord in reach

## 2024-05-11 NOTE — ED ADULT NURSE NOTE - OBJECTIVE STATEMENT
Patient presents to ED for left sided throat pain x6 days, evaluation of tonsilitis. She is A&Ox4, in no acute distress, calm, cooperative. She states she was seen here 2 days ago by ENT and discharged with antibiotics with no major improvement. States she feels like she has difficulty swallowing. Speech slightly muffled/garbled. Left sided throat redness visualized. Denies CP, dizziness, SOB, dyspnea, N/V, fevers. Respirations even, unlabored on room air, saturating 100% on room air. No pallor no cyanosis. History of thyroid nodule, mast cell activation syndrome.

## 2024-05-11 NOTE — CONSULT NOTE ADULT - SUBJECTIVE AND OBJECTIVE BOX
ENT Consult Note     HPI: 34y Female with pmh mast cell activation syndrome (gets recurrent swelling attacks, mostly peripherally but has happened once in her throat, on xolair q2 weeks) who returns to the ED with globus sensation & voice change. She was initially seen on 5/8, exam showed symmetric palate and imaging showed phlegmon / early abscess in the left tonsil. Pt received abx and decadron, symptomatically felt better and was discharged on clindamycin. She returns today for persistent sx. Notes no significant increase in pain but change in throat sensation where she was afraid she would choke. Notes change in sensation in both the right and left sides of her throat. Has been taking the clinda as instructed. Notes she had some brief itchiness 2-3min after taking. Does not recall an allergic rxn to penicillin. Tolerating secretions.     Allergies    penicillin (Unknown)    Intolerances        PAST MEDICAL & SURGICAL HISTORY:  Mast cell activation syndrome      Thyroid nodule  benign      No significant past surgical history          SOCIAL HISTORY:  Tobacco History:  ETOH Use:   Drug Use:     FAMILY HISTORY:      REVIEW OF SYSTEMS    General:	  As per HPI    MEDICATIONS  (STANDING):  ampicillin/sulbactam  IVPB      ampicillin/sulbactam  IVPB 3 Gram(s) IV Intermittent once  ampicillin/sulbactam  IVPB 3 Gram(s) IV Intermittent every 6 hours    MEDICATIONS  (PRN):        Vital Signs Last 24 Hrs  T(C): 36.9 (11 May 2024 12:42), Max: 36.9 (11 May 2024 12:42)  T(F): 98.4 (11 May 2024 12:42), Max: 98.4 (11 May 2024 12:42)  HR: 81 (11 May 2024 12:42) (81 - 94)  BP: 97/63 (11 May 2024 12:42) (97/63 - 116/63)  BP(mean): --  RR: 16 (11 May 2024 12:42) (16 - 16)  SpO2: 100% (11 May 2024 12:42) (98% - 100%)    Parameters below as of 11 May 2024 12:42  Patient On (Oxygen Delivery Method): room air        LABS:  CBC-                        10.5   7.94  )-----------( 232      ( 11 May 2024 11:39 )             34.3         05-11    137  |  104  |  7   ----------------------------<  85  3.8   |  24  |  0.64    Ca    8.7      11 May 2024 11:39    TPro  6.8  /  Alb  3.9  /  TBili  0.9  /  DBili  x   /  AST  12  /  ALT  7   /  AlkPhos  77  05-11    Coagulation Studies-  PT/INR - ( 11 May 2024 11:39 )   PT: 12.8 sec;   INR: 1.15 ratio         PTT - ( 11 May 2024 11:39 )  PTT:30.5 sec  Endocrine Panel-  --  --  8.7 mg/dL        PHYSICAL EXAM:    ENT EXAM-   Constitutional: Well-developed, well-nourished.   Voice: Slightly muffled voice  Head:  normocephalic, atraumatic.   Ears:  External ears normal  Nose:  Septum intact, midline, deviated.  Inferior turbinates normal bilateral  OC/OP: Tongue midline, mild left soft palate fullness, no erythema, 2+ tonsils, mild trismus  Neck:  Trachea midline.  Thyroid, parotid and submandibular glands normal.  Lymph:  No cervical adenopathy.    MULTISYSTEM EXAM-  Neuro/Psych:  Awake, alert, cooperative  Cranial nerves: 2-12 grossly intact bilaterally.  Eyes:  EOMI, no nystagmus.  Pulm:  No dyspnea, non-labored breathing, no stridor or stertor  Cardiovascular: Extremities warm and well perfused  Skin:  No rash or lesions on exposed skin of head/neck        RADIOLOGY & ADDITIONAL STUDIES:      Assessment/Plan:  34y Female with persistent sore throat and previously diagnosed tonsillitis with phlegmon on 5/8. She has mild symptoms c/f PTA but her exam only shows subtle fullness and she notes new bilateral symptoms, more of fullness than pain which is unusual for a classic PTA. Discussed I&D with patient versus imaging first and the pt preferred the latter.   - CT neck with IV contrast   - should switch abx, could consider premedicating before unasyn & close observation (particularly given immunologic hx)   - decadron & toradol x1   - will f/u imaging to eval for possible intervention    Daiana Diamond MD   Otorhinolaryngology Head & Neck Surgery PGY3    [phone]# Pediatric ENT pager  [phone]# Adult ENT pager    Available on Teams, please page if urgent.

## 2024-05-11 NOTE — ED ADULT NURSE NOTE - CHIEF COMPLAINT QUOTE
c/o throat pain x 6 days. Seen here at [facility] ED 2 days ago and diagnosed with tonsilitis. Prescribed clindamycin, but does not feel any improvement. Pt states, " feeling at times, "i'm choking on something". Breathing unlabored, pt's speech garbled and soft. Phx thyroid nodule, mast cell activation syndrome

## 2024-05-11 NOTE — ED PROVIDER NOTE - ATTENDING CONTRIBUTION TO CARE
I performed a history and physical exam of the patient and discussed their management with the resident/fellow/ACP/student. I have reviewed the resident/fellow/ACP/student note and agree with the documented findings and plan of care, except as noted. I have personally performed a substantive portion of the visit including all aspects of the medical decision making. My medical decision making and observations are found above. Please refer to any progress notes for updates on clinical course.    34-year-old female past medical history of mast cell activation syndrome presenting with a few days of sore throat. Recently in ER few days ago and found to have left-sided intratonsillar abscess with surrounding phlegmon, seen by ENT and managed conservatively with clindamycin and steroids and DC'd home with no drainage.  Patient returns today for worsening throat pain, difficulty swallowing with no associated fever/chills, N/v/D, cough, rashes.  Reports still able to tolerate p.o. but more difficult.  Denies any pooling secretions, neck pain but does report muffled voice.     Gen: WDWN, NAD, comfortable appearing, afebrile, hemodynamically stable, mildly muffled voice   HEENT: PERRLA, EOMI, no nasal discharge, mucous membranes moist, left sided oropharyngeal edema/erythema with mild uvular deviation, able to open mouth fully, no neck swelling/TTP   CV: RRR, +S1/S2, no M/R/G, equal b/l radial pulses 2+  Resp: CTAB, no W/R/R, SPO2 >95% on RA, no increased WOB, no stridor   GI: Abdomen soft non-distended, NTTP, no masses/organomegaly   MSK/Skin: No open wounds, no bruising  Neuro: A&Ox4, moving all 4 extremities spontaneously   Psych: appropriate mood    MDM:   34-year-old female past medical history of mast cell activation syndrome presenting with a few days of sore throat.  Recently in ER few days ago and found to have left-sided intratonsillar sore abscess with surrounding phlegmon, seen by ENT and managed conservatively with clindamycin and steroids and DC'd home with no drainage; left sided oropharyngeal edema/erythema with mild uvular deviation, muffled voice, Afebrile, hemodynamically stable, tolerating secretions.  Exam/history concerning for but not limited to worsening intratonsillar abscess.  Will repeat labs, consult ENT and consider repeat imaging for possible growth pending ENT eval

## 2024-05-11 NOTE — ED PROVIDER NOTE - PATIENT PORTAL LINK FT
You can access the FollowMyHealth Patient Portal offered by [facility] by registering at the following website: [email or web address]. By joining [employer]’s FollowMyHealth portal, you will also be able to view your health information using other applications (apps) compatible with our system.

## 2024-06-10 NOTE — ED ADULT NURSE NOTE - CINV DISCH MEDS REVIEWED YN
Refill must be reviewed and completed by the office or provider. The refill is unable to be approved or denied by the medication management team.          Yes

## 2024-07-12 ENCOUNTER — APPOINTMENT (OUTPATIENT)
Dept: ULTRASOUND IMAGING | Facility: CLINIC | Age: 34
End: 2024-07-12

## 2024-07-12 PROCEDURE — 76536 US EXAM OF HEAD AND NECK: CPT

## 2024-07-31 ENCOUNTER — NON-APPOINTMENT (OUTPATIENT)
Age: 34
End: 2024-07-31

## 2024-09-11 ENCOUNTER — APPOINTMENT (OUTPATIENT)
Dept: ENDOCRINOLOGY | Facility: CLINIC | Age: 34
End: 2024-09-11
Payer: COMMERCIAL

## 2024-09-11 VITALS
HEIGHT: 65 IN | OXYGEN SATURATION: 98 % | BODY MASS INDEX: 30.99 KG/M2 | SYSTOLIC BLOOD PRESSURE: 110 MMHG | DIASTOLIC BLOOD PRESSURE: 80 MMHG | WEIGHT: 186 LBS | HEART RATE: 83 BPM

## 2024-09-11 DIAGNOSIS — E04.2 NONTOXIC MULTINODULAR GOITER: ICD-10-CM

## 2024-09-11 PROCEDURE — 10005 FNA BX W/US GDN 1ST LES: CPT

## 2024-09-11 PROCEDURE — 99213 OFFICE O/P EST LOW 20 MIN: CPT

## 2024-09-11 NOTE — PROCEDURE
[Fine Needle Aspiration] : Fine needle aspiration ~T ~C was performed. [Area of Mass: ______] : mass identified in the [unfilled] [Risks] : risks [Benefits] : benefits [Patient] : the patient [Lidocaine Cream] : lidocaine cream [Supine] : The patient was placed in the supine position with the neck extended as tolerated. [Alcohol] : with alcohol [25 gauge 1.5 inch] : A 25 gauge 1.5 inch needle was used [3 Passes] : 3 passes were made through the mass [Ultrasonic Guidance] : ultrasound guidance was employed [Sent to Histology] : The specimens were prepared in the usual manner and sent to histology. [Thyroseq] : Thyroseq [Tolerated Well] : the patient tolerated the procedure well [Hemostasis] : hemostasis was assured and the patient was discharged in satisfactory condition [No Complications] : There were no complications [Instructions Given] : Handouts/patient instructions were given to patient

## 2024-09-11 NOTE — ASSESSMENT
[FreeTextEntry1] : 1. Thyroid Nodule/Multinodular goiter Dominant nodule RMP 2.8 cm   - FNA performed in office today - Sample was saved for ThyroSeqv3 - Patient tolerated procedure well, provided post-procedural instructions: avoid heavy lifting today, can use ice pack or OTC pain medications (acetaminophen or ibuprofen) if needed. Call back in case of any swelling increasing in size, or severe pain at the site. - Will follow up cytology results and formulate further plan with patient  Lupe Gimenez MD [facility] at [address], [address] Ph: [phone] Fax: [phone]

## 2024-09-11 NOTE — ASSESSMENT
[FreeTextEntry1] : 1. Thyroid Nodule/Multinodular goiter Dominant nodule RMP 2.8 cm   - FNA performed in office today - Sample was saved for ThyroSeqv3 - Patient tolerated procedure well, provided post-procedural instructions: avoid heavy lifting today, can use ice pack or OTC pain medications (acetaminophen or ibuprofen) if needed. Call back in case of any swelling increasing in size, or severe pain at the site. - Will follow up cytology results and formulate further plan with patient  Lupe Gimenez MD [facility] at [address], [address] Ph: [phone] Fax: [phone]

## 2024-09-11 NOTE — REASON FOR VISIT
[Follow - Up] : a follow-up visit [Thyroid nodule/ MNG] : thyroid nodule/ MNG [Thyroid Biopsy] : a thyroid biopsy [Procedure: _________] : a [unfilled] procedure visit

## 2024-09-11 NOTE — PHYSICAL EXAM
[TextEntry] : PHYSICAL EXAMINATION: Vital signs from today's encounter reviewed.  GENERAL: No acute distress, clinically eukinetic, normal appearance HEAD: Normocephalic, atraumatic EYES: conjunctivae are pink and moist, no icterus, no proptosis  NECK: thyroid is not enlarged/nodular on palpation, non-tender, no adenopathy CARDIOVASCULAR: well-perfused extremities, no peripheral edema RESPIRATORY: normal chest expansion with good pulmonary effort, no acute respiratory distress NEUROLOGIC: alert and oriented, no evident focal deficits, no tremors  PSYCHIATRIC: mood and affect are normal

## 2024-09-11 NOTE — HISTORY OF PRESENT ILLNESS
[FreeTextEntry1] : CHIEF COMPLAINT: Thyroid nodule referred for FNA REFERRED BY: Dr. Kandace Mcdonald Available prior medical records reviewed.   HISTORY OF PRESENTING ILLNESS: The patient is a 34-year-old female being seen in the office today for FNA of thyroid nodule.  Thyroid nodules since at least 2021, previous FNA without thyroid cancer. Normal TFTs 4/2024. Sister with Graves' disease, thyroid eye disease. No history of head and neck radiation exposure. Clinically euthyroid. No compressive symptoms such as dysphagia/dysphonia/dyspnea.  7/12/24 thyroid US, images personally reviewed and interpreted. RMP 2.8x2.5x1.4 cm isoechoic RMP 1x0.9x0.5 cm isoechoic/cystic LLP 1.1x1.1x0.3cm isoechoic LMP 1.1x0.8x0.4 cm isoechoic No abnormal cervical lymphadenopathy.  She is here for FNA RMP 2.8 cm nodule given interval increase in size.

## 2024-09-12 LAB — FNA, THYROID: NORMAL

## 2025-04-06 ENCOUNTER — EMERGENCY (EMERGENCY)
Facility: HOSPITAL | Age: 35
LOS: 1 days | End: 2025-04-06
Attending: EMERGENCY MEDICINE
Payer: COMMERCIAL

## 2025-04-06 VITALS
DIASTOLIC BLOOD PRESSURE: 77 MMHG | RESPIRATION RATE: 16 BRPM | OXYGEN SATURATION: 99 % | TEMPERATURE: 99 F | SYSTOLIC BLOOD PRESSURE: 125 MMHG | HEART RATE: 95 BPM

## 2025-04-06 VITALS
TEMPERATURE: 99 F | OXYGEN SATURATION: 99 % | SYSTOLIC BLOOD PRESSURE: 156 MMHG | DIASTOLIC BLOOD PRESSURE: 85 MMHG | RESPIRATION RATE: 18 BRPM | WEIGHT: 184.97 LBS | HEART RATE: 89 BPM | HEIGHT: 66 IN

## 2025-04-06 RX ORDER — PREDNISONE 20 MG/1
20 TABLET ORAL ONCE
Refills: 0 | Status: COMPLETED | OUTPATIENT
Start: 2025-04-06 | End: 2025-04-06

## 2025-04-06 RX ORDER — PREDNISONE 20 MG/1
1 TABLET ORAL
Qty: 6 | Refills: 0
Start: 2025-04-06 | End: 2025-04-08

## 2025-04-06 RX ADMIN — PREDNISONE 20 MILLIGRAM(S): 20 TABLET ORAL at 14:17

## 2025-04-06 NOTE — ED PROVIDER NOTE - PATIENT PORTAL LINK FT
You can access the FollowMyHealth Patient Portal offered by [facility] by registering at the following website: [email or web address]. By joining [employer]’s FollowMyHealth portal, you will also be able to view your health information using other applications (apps) compatible with our system.

## 2025-04-06 NOTE — ED ADULT TRIAGE NOTE - WEIGHT IN KG
hx of asthma was intubated for asthma 3 years ago presents with SOB tachycardia and fever hypoxic on room air. LS wheezing in all fields non productive cough.
83.9

## 2025-04-06 NOTE — ED PROVIDER NOTE - CLINICAL SUMMARY MEDICAL DECISION MAKING FREE TEXT BOX
attending Raymundo: 34yF h/o mast cell syndrome with current flare on prednisone 40mg prescribe by allergist p/w upper extremity swelling L>R for the last week. Patient feels like her face and legs are swollen as well and heaviness across her whole upper body. Of note patient was prescribed prednisone 40mg x3 days however she would take prednisone 20mg once daily to make the prednisone last longer as she feels improved with prednisone. Denied CP, SOB, OCP use, prolonged sedentary period, DVT/PE, smoking, recent IV placed to arms. Likely related to known mast cell syndrome with under dosing of prednisone. Will obtain duplex eval for DVT, reassess

## 2025-04-06 NOTE — ED ADULT TRIAGE NOTE - NSWEIGHTCALCTOOLDRUG_GEN_A_CORE
[Normal] : normal rate, regular rhythm, normal S1 and S2 and no murmur heard [Soft] : abdomen soft [No CVA Tenderness] : no CVA  tenderness [de-identified] : no bladder fullness or tenderness  used

## 2025-04-06 NOTE — ED ADULT NURSE NOTE - OBJECTIVE STATEMENT
34 y.o. female coming in from home via private car for b/l arm pain/swelling and chest pain x 5 days. pt states that she went to her allergist when she started having the swelling/pain and was placed on a steroid, pt states that she was doing well on the steroids but states that she just had her last dose today and was concerned about how she would feel tomorrow when she does not have the steroids anymore. 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 A&Ox3, vss, NSR on tele, endorses CP and full body weakness/swelling, no pitting edema noted on extremities, pt denies SOB/dizziness/fevers/chills/N/V/D/urinary symptoms, bed in lowest position

## 2025-04-06 NOTE — ED PROVIDER NOTE - NSFOLLOWUPINSTRUCTIONS_ED_ALL_ED_FT
Follow up with your Primary Care Physician within the next 2-3 days  Bring a copy of your test results with you to your appointment  Continue your current medication regimen  Return to the Emergency Room if you experience new or worsening symptoms abdominal pain, nausea, vomiting, fever chills, cough, chest pain, shortness of breath, dizziness, slurred speech, weakness, gait abnormality   FOLLOW UP WITH YOUR ALLERGIST   TAKE PREDNISONE 40MG FOR THE NEXT THREE DAYS

## 2025-04-06 NOTE — ED PROVIDER NOTE - OBJECTIVE STATEMENT
33 y/o female PMHx mast cell syndrome with current flare on prednisone 40mg prescribe by allergist now presenting to the ED with upper extremity swelling L>R for the last week. Patient feels like her face and legs are swollen as well and heaviness across her whole upper body. Of note patient was prescribed prednisone 40mg x3 days however she would take prednisone 20mg once daily to make the prednisone last longer as she feels improved with prednisone. Denied CP, SOB, OCP use, prolonged sedentary period, DVT/PE, smoking

## 2025-04-10 ENCOUNTER — NON-APPOINTMENT (OUTPATIENT)
Age: 35
End: 2025-04-10

## 2025-04-21 ENCOUNTER — APPOINTMENT (OUTPATIENT)
Dept: ENDOCRINOLOGY | Facility: CLINIC | Age: 35
End: 2025-04-21

## 2025-04-21 VITALS
DIASTOLIC BLOOD PRESSURE: 74 MMHG | HEART RATE: 83 BPM | BODY MASS INDEX: 28.99 KG/M2 | RESPIRATION RATE: 16 BRPM | HEIGHT: 65 IN | WEIGHT: 174 LBS | OXYGEN SATURATION: 99 % | SYSTOLIC BLOOD PRESSURE: 113 MMHG

## 2025-04-21 DIAGNOSIS — E04.2 NONTOXIC MULTINODULAR GOITER: ICD-10-CM

## 2025-04-21 PROCEDURE — 99213 OFFICE O/P EST LOW 20 MIN: CPT | Mod: GC

## 2025-04-21 PROCEDURE — G2211 COMPLEX E/M VISIT ADD ON: CPT | Mod: NC

## 2025-04-22 ENCOUNTER — APPOINTMENT (OUTPATIENT)
Dept: RHEUMATOLOGY | Facility: CLINIC | Age: 35
End: 2025-04-22

## 2025-07-17 ENCOUNTER — APPOINTMENT (OUTPATIENT)
Dept: RHEUMATOLOGY | Facility: CLINIC | Age: 35
End: 2025-07-17

## 2025-07-24 ENCOUNTER — LABORATORY RESULT (OUTPATIENT)
Age: 35
End: 2025-07-24

## 2025-07-24 ENCOUNTER — APPOINTMENT (OUTPATIENT)
Dept: RHEUMATOLOGY | Facility: CLINIC | Age: 35
End: 2025-07-24
Payer: COMMERCIAL

## 2025-07-24 VITALS
RESPIRATION RATE: 16 BRPM | HEART RATE: 88 BPM | WEIGHT: 174 LBS | DIASTOLIC BLOOD PRESSURE: 70 MMHG | BODY MASS INDEX: 28.99 KG/M2 | SYSTOLIC BLOOD PRESSURE: 110 MMHG | OXYGEN SATURATION: 99 % | HEIGHT: 65 IN

## 2025-07-24 DIAGNOSIS — G56.03 CARPAL TUNNEL SYNDROM,BILATERAL UPPER LIMBS: ICD-10-CM

## 2025-07-24 PROCEDURE — 99205 OFFICE O/P NEW HI 60 MIN: CPT

## 2025-07-24 PROCEDURE — G2211 COMPLEX E/M VISIT ADD ON: CPT | Mod: NC

## 2025-07-28 LAB
ALBUMIN MFR SERPL ELPH: 59.7 %
ALBUMIN SERPL ELPH-MCNC: 4.4 G/DL
ALBUMIN SERPL-MCNC: 4.3 G/DL
ALBUMIN/GLOB SERPL: 1.5 RATIO
ALP BLD-CCNC: 77 U/L
ALPHA1 GLOB MFR SERPL ELPH: 4 %
ALPHA1 GLOB SERPL ELPH-MCNC: 0.3 G/DL
ALPHA2 GLOB MFR SERPL ELPH: 8.9 %
ALPHA2 GLOB SERPL ELPH-MCNC: 0.6 G/DL
ALT SERPL-CCNC: 10 U/L
ANA SCREEN BY IMMUNOASSAY: NEGATIVE
ANION GAP SERPL CALC-SCNC: 12 MMOL/L
AST SERPL-CCNC: 21 U/L
B-GLOBULIN MFR SERPL ELPH: 10.8 %
B-GLOBULIN SERPL ELPH-MCNC: 0.8 G/DL
BASOPHILS # BLD AUTO: 0.03 K/UL
BASOPHILS NFR BLD AUTO: 0.5 %
BILIRUB SERPL-MCNC: 0.7 MG/DL
BUN SERPL-MCNC: 8 MG/DL
C3 SERPL-MCNC: 111 MG/DL
C4 SERPL-MCNC: 23 MG/DL
CALCIUM SERPL-MCNC: 9.5 MG/DL
CCP AB SER IA-ACNC: <8 U/ML
CCP AB SER QL: NEGATIVE
CENTROMERE B AB SER-ACNC: <0.2 AL
CHLORIDE SERPL-SCNC: 104 MMOL/L
CHROMATIN AB SERPL-ACNC: <0.2 AL
CK SERPL-CCNC: 107 U/L
CO2 SERPL-SCNC: 24 MMOL/L
CREAT SERPL-MCNC: 0.67 MG/DL
CRP SERPL-MCNC: <3 MG/L
DSDNA AB SER-ACNC: <1 IU/ML
EGFRCR SERPLBLD CKD-EPI 2021: 115 ML/MIN/1.73M2
ENA JO1 AB SER-ACNC: <0.2 AL
ENA RNP AB SER-ACNC: <0.2 AL
ENA SCL70 AB SER-ACNC: <0.2 AL
ENA SM AB SER-ACNC: <0.2 AL
ENA SS-A AB SER-ACNC: <0.2 AL
ENA SS-B AB SER-ACNC: <0.2 AL
EOSINOPHIL # BLD AUTO: 0.31 K/UL
EOSINOPHIL NFR BLD AUTO: 5.3 %
ERYTHROCYTE [SEDIMENTATION RATE] IN BLOOD BY WESTERGREN METHOD: 39 MM/HR
GAMMA GLOB FLD ELPH-MCNC: 1.2 G/DL
GAMMA GLOB MFR SERPL ELPH: 16.6 %
GLUCOSE SERPL-MCNC: 71 MG/DL
HCT VFR BLD CALC: 40.2 %
HGB BLD-MCNC: 11.2 G/DL
IMM GRANULOCYTES NFR BLD AUTO: 0.2 %
INTERPRETATION SERPL IEP-IMP: NORMAL
LYMPHOCYTES # BLD AUTO: 1.7 K/UL
LYMPHOCYTES NFR BLD AUTO: 29.1 %
M PROTEIN SPEC IFE-MCNC: NORMAL
MAN DIFF?: NORMAL
MCHC RBC-ENTMCNC: 22.1 PG
MCHC RBC-ENTMCNC: 27.9 G/DL
MCV RBC AUTO: 79.3 FL
MONOCYTES # BLD AUTO: 0.33 K/UL
MONOCYTES NFR BLD AUTO: 5.7 %
NEUTROPHILS # BLD AUTO: 3.46 K/UL
NEUTROPHILS NFR BLD AUTO: 59.2 %
PLATELET # BLD AUTO: 230 K/UL
POTASSIUM SERPL-SCNC: 4.1 MMOL/L
PROT SERPL-MCNC: 7.2 G/DL
RBC # BLD: 5.07 M/UL
RBC # FLD: 15.1 %
RHEUMATOID FACT SERPL-ACNC: <10 IU/ML
RIBOSOMAL P AB SER-ACNC: <0.2 AL
SODIUM SERPL-SCNC: 139 MMOL/L
THYROGLOB AB SERPL-ACNC: 17.1 IU/ML
THYROPEROXIDASE AB SERPL IA-ACNC: <9 IU/ML
TSH SERPL-ACNC: 1.57 UIU/ML
WBC # FLD AUTO: 5.84 K/UL

## 2025-08-07 ENCOUNTER — APPOINTMENT (OUTPATIENT)
Dept: RHEUMATOLOGY | Facility: CLINIC | Age: 35
End: 2025-08-07
Payer: COMMERCIAL

## 2025-08-07 DIAGNOSIS — J30.2 OTHER SEASONAL ALLERGIC RHINITIS: ICD-10-CM

## 2025-08-07 PROCEDURE — G2211 COMPLEX E/M VISIT ADD ON: CPT | Mod: NC,95

## 2025-08-07 PROCEDURE — 99213 OFFICE O/P EST LOW 20 MIN: CPT | Mod: 95

## 2025-08-14 ENCOUNTER — NON-APPOINTMENT (OUTPATIENT)
Age: 35
End: 2025-08-14

## 2025-08-16 ENCOUNTER — APPOINTMENT (OUTPATIENT)
Dept: MRI IMAGING | Facility: HOSPITAL | Age: 35
End: 2025-08-16

## 2025-09-05 ENCOUNTER — RESULT REVIEW (OUTPATIENT)
Age: 35
End: 2025-09-05